# Patient Record
Sex: FEMALE | Race: ASIAN | NOT HISPANIC OR LATINO | Employment: STUDENT | URBAN - METROPOLITAN AREA
[De-identification: names, ages, dates, MRNs, and addresses within clinical notes are randomized per-mention and may not be internally consistent; named-entity substitution may affect disease eponyms.]

---
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
## 2021-01-03 ENCOUNTER — HEALTH MAINTENANCE LETTER (OUTPATIENT)
Age: 21
End: 2021-01-03

## 2021-01-15 ENCOUNTER — HEALTH MAINTENANCE LETTER (OUTPATIENT)
Age: 21
End: 2021-01-15

## 2021-02-05 ENCOUNTER — MYC MEDICAL ADVICE (OUTPATIENT)
Dept: FAMILY MEDICINE | Facility: CLINIC | Age: 21
End: 2021-02-05

## 2021-02-12 ENCOUNTER — E-VISIT (OUTPATIENT)
Dept: FAMILY MEDICINE | Facility: CLINIC | Age: 21
End: 2021-02-12
Payer: COMMERCIAL

## 2021-02-12 DIAGNOSIS — L70.9 ACNE, UNSPECIFIED ACNE TYPE: Primary | ICD-10-CM

## 2021-02-12 PROCEDURE — 99207 PR NON-BILLABLE SERV PER CHARTING: CPT | Performed by: NURSE PRACTITIONER

## 2021-03-15 ENCOUNTER — OFFICE VISIT (OUTPATIENT)
Dept: FAMILY MEDICINE | Facility: CLINIC | Age: 21
End: 2021-03-15
Payer: COMMERCIAL

## 2021-03-15 VITALS
HEIGHT: 64 IN | RESPIRATION RATE: 12 BRPM | SYSTOLIC BLOOD PRESSURE: 102 MMHG | DIASTOLIC BLOOD PRESSURE: 62 MMHG | WEIGHT: 118.3 LBS | HEART RATE: 80 BPM | OXYGEN SATURATION: 97 % | BODY MASS INDEX: 20.2 KG/M2

## 2021-03-15 DIAGNOSIS — L70.0 ACNE VULGARIS: ICD-10-CM

## 2021-03-15 DIAGNOSIS — Z00.00 ROUTINE GENERAL MEDICAL EXAMINATION AT A HEALTH CARE FACILITY: Primary | ICD-10-CM

## 2021-03-15 PROCEDURE — 99395 PREV VISIT EST AGE 18-39: CPT | Performed by: FAMILY MEDICINE

## 2021-03-15 PROCEDURE — 99213 OFFICE O/P EST LOW 20 MIN: CPT | Mod: 25 | Performed by: FAMILY MEDICINE

## 2021-03-15 RX ORDER — NORGESTIMATE AND ETHINYL ESTRADIOL 7DAYSX3 LO
1 KIT ORAL DAILY
Qty: 84 TABLET | Refills: 3 | Status: SHIPPED | OUTPATIENT
Start: 2021-03-15 | End: 2022-06-23

## 2021-03-15 ASSESSMENT — MIFFLIN-ST. JEOR: SCORE: 1288.67

## 2021-03-15 NOTE — PROGRESS NOTES
SUBJECTIVE:   CC: Jessica Styles is an 20 year old woman who presents for preventive health visit.       Patient has been advised of split billing requirements and indicates understanding: Yes  Healthy Habits:    Getting at least 3 servings of Calcium per day:  Yes    Bi-annual eye exam:  NO    Dental care twice a year:  Yes    Sleep apnea or symptoms of sleep apnea:  None    Diet:  Regular (no restrictions)    Frequency of exercise:  4-5 days/week    Duration of exercise:  45-60 minutes    Taking medications regularly:  Not Applicable    Barriers to taking medications:  Not applicable    Medication side effects:  Not applicable    PHQ-2 Total Score:    Additional concerns today:  Yes  walking - tolerated well.        Acne concerns -  Has used OTC meds.  Cleansers, toner.  Painful one under skin recently.  Not much help with salicylic acid or benzoyl peroxide  Not sexually active, not planning sexual activity    Today's PHQ-2 Score:   PHQ-2 ( 1999 Pfizer) 3/15/2021   Q1: Little interest or pleasure in doing things 0   Q2: Feeling down, depressed or hopeless 0   PHQ-2 Score 0       Abuse: Current or Past (Physical, Sexual or Emotional) - No  Do you feel safe in your environment? Yes       Have you ever done Advance Care Planning? (For example, a Health Directive, POLST, or a discussion with a medical provider or your loved ones about your wishes): No, advance care planning information given to patient to review.  Patient declined advance care planning discussion at this time.    Social History     Tobacco Use     Smoking status: Never Smoker     Smokeless tobacco: Never Used   Substance Use Topics     Alcohol use: Never     Frequency: Never     If you drink alcohol do you typically have >3 drinks per day or >7 drinks per week? No    Alcohol Use 3/15/2021   Prescreen: >3 drinks/day or >7 drinks/week? No         Reviewed orders with patient.  Reviewed health maintenance and updated orders accordingly - Yes  BP  "Readings from Last 3 Encounters:   03/15/21 102/62   12/12/19 92/50   01/08/19 126/81 (94 %, Z = 1.53 /  95 %, Z = 1.64)*     *BP percentiles are based on the 2017 AAP Clinical Practice Guideline for girls    Wt Readings from Last 3 Encounters:   03/15/21 53.7 kg (118 lb 4.8 oz) (32 %, Z= -0.48)*   12/12/19 50.6 kg (111 lb 9.6 oz) (23 %, Z= -0.74)*   01/08/19 52.2 kg (115 lb) (35 %, Z= -0.40)*     * Growth percentiles are based on [facility] (Girls, 2-20 Years) data.                        History of abnormal Pap smear: NO - under age 21, PAP not appropriate for age     Reviewed and updated as needed this visit by clinical staff  Tobacco  Allergies  Meds   Med Hx  Surg Hx  Fam Hx  Soc Hx        Reviewed and updated as needed this visit by Provider  Tobacco  Allergies  Meds   Med Hx  Surg Hx  Fam Hx  Soc Hx       History reviewed. No pertinent past medical history.   Past Surgical History:   Procedure Laterality Date     NO HISTORY OF SURGERY         Review of Systems  10 point ROS of systems including Constitutional, Eyes, Respiratory, Cardiovascular, Gastroenterology, Genitourinary, Integumentary, Muscularskeletal, Psychiatric were all negative except for pertinent positives noted in my HPI.       OBJECTIVE:   /62   Pulse 80   Resp 12   Ht 1.613 m (5' 3.5\")   Wt 53.7 kg (118 lb 4.8 oz)   LMP 03/02/2021 (Approximate)   SpO2 97%   BMI 20.63 kg/m    Physical Exam  GENERAL: healthy, alert and no distress  EYES: Eyes grossly normal to inspection, PERRL and conjunctivae and sclerae normal  HENT: ear canals and TM's normal, nose and mouth without ulcers or lesions  NECK: no adenopathy, no asymmetry, masses, or scars and thyroid normal to palpation  RESP: lungs clear to auscultation - no rales, rhonchi or wheezes  BREAST: normal without masses, tenderness or nipple discharge and no palpable axillary masses or adenopathy  CV: regular rate and rhythm, normal S1 S2, no S3 or S4, no murmur, click or rub, no " "peripheral edema and peripheral pulses strong  ABDOMEN: soft, nontender, no hepatosplenomegaly, no masses and bowel sounds normal  MS: no gross musculoskeletal defects noted, no edema  SKIN: no suspicious lesions or rashes, acne across the face and upper back - comedone closed.  No current cystic lesions.  Has photos of cystic lesions that come cyclically.  NEURO: Normal strength and tone, mentation intact and speech normal  PSYCH: mentation appears normal, affect normal/bright        ASSESSMENT/PLAN:   1. Routine general medical examination at a health care facility  Screening and preventative care discussed.  Discussed completing varicella, Hep B vaccine series - declines currently.  Declines lab testing.     2. Acne vulgaris  Discussed various forms of treatment including topicals and oral medications-antibiotics and oral contraceptives.  At this point time she prefers to try the oral contraceptive.  She is instructed to begin this after her next menstrual cycle.  We discussed risks and benefits.  - norgestim-eth estrad triphasic (ORTHO TRI-CYCLEN LO) 0.18/0.215/0.25 MG-25 MCG tablet; Take 1 tablet by mouth daily  Dispense: 84 tablet; Refill: 3    Patient has been advised of split billing requirements and indicates understanding: Yes  COUNSELING:  Reviewed preventive health counseling, as reflected in patient instructions       Regular exercise       Healthy diet/nutrition       Vision screening       Immunizations    Declined: Hepatitis B, Influenza and Varicella due to Other does not want a shot               Family planning       Osteoporosis prevention/bone health       Safe sex practices/STD prevention    Estimated body mass index is 20.63 kg/m  as calculated from the following:    Height as of this encounter: 1.613 m (5' 3.5\").    Weight as of this encounter: 53.7 kg (118 lb 4.8 oz).        She reports that she has never smoked. She has never used smokeless tobacco.      Counseling Resources:  ATP IV " Guidelines  Pooled Cohorts Equation Calculator  Breast Cancer Risk Calculator  BRCA-Related Cancer Risk Assessment: FHS-7 Tool  FRAX Risk Assessment  ICSI Preventive Guidelines  Dietary Guidelines for Americans, 2010  USDA's MyPlate  ASA Prophylaxis  Lung CA Screening    Arleen Menendez MD  [facility]        Patient Instructions   On the Sunday after your next menstrual period starts begin the birth control pills for your skin.  Take approximately the same time daily.    Follow up for side effects or other concerns.    Continue topical treatments.      Follow up in 3 months if skin is not improved with medication.

## 2021-03-15 NOTE — PATIENT INSTRUCTIONS
On the Sunday after your next menstrual period starts begin the birth control pills for your skin.  Take approximately the same time daily.    Follow up for side effects or other concerns.    Continue topical treatments.      Follow up in 3 months if skin is not improved with medication.

## 2021-03-16 PROBLEM — L70.0 ACNE VULGARIS: Status: ACTIVE | Noted: 2021-03-16

## 2021-03-28 ENCOUNTER — MYC MEDICAL ADVICE (OUTPATIENT)
Dept: FAMILY MEDICINE | Facility: CLINIC | Age: 21
End: 2021-03-28

## 2021-07-26 ENCOUNTER — MYC MEDICAL ADVICE (OUTPATIENT)
Dept: FAMILY MEDICINE | Facility: CLINIC | Age: 21
End: 2021-07-26

## 2021-07-26 DIAGNOSIS — B00.1 RECURRENT HERPES LABIALIS: ICD-10-CM

## 2021-07-27 NOTE — TELEPHONE ENCOUNTER
Routing refill request to provider for review/approval because:  Labs not current:  Creatinine    Radha Daylin Richard RN, BSN, CMSRN  [facility]

## 2021-07-28 RX ORDER — VALACYCLOVIR HYDROCHLORIDE 1 G/1
2000 TABLET, FILM COATED ORAL 2 TIMES DAILY
Qty: 4 TABLET | Refills: 4 | Status: SHIPPED | OUTPATIENT
Start: 2021-07-28 | End: 2021-08-02

## 2021-07-31 DIAGNOSIS — B00.1 RECURRENT HERPES LABIALIS: ICD-10-CM

## 2021-08-01 DIAGNOSIS — B00.1 RECURRENT HERPES LABIALIS: ICD-10-CM

## 2021-08-02 DIAGNOSIS — B00.1 RECURRENT HERPES LABIALIS: ICD-10-CM

## 2021-08-02 RX ORDER — VALACYCLOVIR HYDROCHLORIDE 1 G/1
TABLET, FILM COATED ORAL
Qty: 4 TABLET | Refills: 4 | OUTPATIENT
Start: 2021-08-02

## 2021-08-02 RX ORDER — VALACYCLOVIR HYDROCHLORIDE 1 G/1
TABLET, FILM COATED ORAL
Qty: 4 TABLET | Refills: 4 | Status: SHIPPED | OUTPATIENT
Start: 2021-08-02 | End: 2021-11-03

## 2021-08-02 NOTE — TELEPHONE ENCOUNTER
Routing refill request to provider for review/approval because:  Labs not current:  Creatinine    Lorena Holland BSN, RN

## 2021-08-02 NOTE — TELEPHONE ENCOUNTER
Duplicate order prescription filled on 8/2/2021 with 4 refills.   Siri Starkey RN, BSN   [facility]

## 2021-08-03 RX ORDER — VALACYCLOVIR HYDROCHLORIDE 1 G/1
TABLET, FILM COATED ORAL
Qty: 4 TABLET | Refills: 4 | OUTPATIENT
Start: 2021-08-03

## 2021-08-03 NOTE — TELEPHONE ENCOUNTER
Duplicate refill request for valacyclovir.   This prescription was refilled on 8/2/21.         Jennifer Mckeon RN  [facility]

## 2021-10-10 ENCOUNTER — HEALTH MAINTENANCE LETTER (OUTPATIENT)
Age: 21
End: 2021-10-10

## 2021-11-03 DIAGNOSIS — B00.1 RECURRENT HERPES LABIALIS: ICD-10-CM

## 2021-11-04 RX ORDER — VALACYCLOVIR HYDROCHLORIDE 1 G/1
TABLET, FILM COATED ORAL
Qty: 4 TABLET | Refills: 4 | Status: SHIPPED | OUTPATIENT
Start: 2021-11-04 | End: 2022-06-23

## 2021-11-04 NOTE — TELEPHONE ENCOUNTER
"Requested Prescriptions   Pending Prescriptions Disp Refills     valACYclovir (VALTREX) 1000 mg tablet 4 tablet 4       Antivirals for Herpes Protocol Failed - 11/3/2021 12:43 PM        Failed - Normal serum creatinine on file in past 12 months     No lab results found.    Ok to refill medication if creatinine is low          Passed - Patient is age 12 or older        Passed - Recent (12 mo) or future (30 days) visit within the authorizing provider's specialty     Patient has had an office visit with the authorizing provider or a provider within the authorizing providers department within the previous 12 mos or has a future within next 30 days. See \"Patient Info\" tab in inbasket, or \"Choose Columns\" in Meds & Orders section of the refill encounter.              Passed - Medication is active on med list           Lorena PERSAUD, RN    "

## 2022-02-14 ENCOUNTER — NURSE TRIAGE (OUTPATIENT)
Dept: NURSING | Facility: CLINIC | Age: 22
End: 2022-02-14
Payer: COMMERCIAL

## 2022-02-14 NOTE — TELEPHONE ENCOUNTER
Pt stated that a family member tested positive for COVID - pt has been in contact with that family member     PT is vaccinated for COVID     Pt is having sinus congestion and no other symptoms      Per protocol - Call PCP within 24 hours     Pt will be contacting [facility] today     Care advice given per protocol and when to call back. Pt verbalized understanding and agrees to plan of care.    Brooklyn Ramos RN  [place] [facility] Advisor  6:57 AM 2/14/2022      COVID 19 Nurse Triage Plan/Patient Instructions    Please be aware that novel coronavirus (COVID-19) may be circulating in the community. If you develop symptoms such as fever, cough, or SOB or if you have concerns about the presence of another infection including coronavirus (COVID-19), please contact your health care provider or visit [email or web address].     Disposition/Instructions    Virtual Visit with provider recommended. Reference Visit Selection Guide.    Thank you for taking steps to prevent the spread of this virus.  o Limit your contact with others.  o Wear a simple mask to cover your cough.  o Wash your hands well and often.    Resources    [facility]: About COVID-19: [email or web address]    CDC: What to Do If You're Sick: www.cdc.gov/coronavirus/2019-ncov/about/steps-when-sick.html    CDC: Ending Home Isolation: www.cdc.gov/coronavirus/2019-ncov/hcp/disposition-in-home-patients.html     CDC: Caring for Someone: www.cdc.gov/coronavirus/2019-ncov/if-you-are-sick/care-for-someone.html     [facility]: Interim Guidance for Hospital Discharge to Home: www.health.[facility]/diseases/coronavirus/hcp/hospdischarge.pdf    [facility] clinical trials (COVID-19 research studies): clinicalaffairs.[facility].edu/um-clinical-trials     Below are the COVID-19 hotlines at the [place] Department of Health ([facility]). Interpreters are available.   o For health questions: Call 618-102-6908 or 1-381.240.2545 (7 a.m. to 7 p.m.)  o For questions  about schools and childcare: Call 572-207-4185 or 1-639.842.9370 (7 a.m. to 7 p.m.)                                 Reason for Disposition    [1] CLOSE CONTACT COVID-19 EXPOSURE within last 14 days AND [2] needs COVID-19 lab test to return to work AND [3] NO symptoms    Additional Information    Negative: COVID-19 lab test positive    Negative: [1] Lives with someone known to have influenza (flu test positive) AND [2] flu-like symptoms (e.g., cough, runny nose, sore throat, SOB; with or without fever)    Negative: [1] Symptoms of COVID-19 (e.g., cough, fever, SOB, or others) AND [2] HCP diagnosed COVID-19 based on symptoms    Negative: [1] Symptoms of COVID-19 (e.g., cough, fever, SOB, or others) AND [2] lives in an area with community spread    Negative: [1] Symptoms of COVID-19 (e.g., cough, fever, SOB, or others) AND [2] within 14 days of EXPOSURE (close contact) with diagnosed or suspected COVID-19 patient    Negative: [1] Symptoms of COVID-19 (e.g., cough, fever, SOB, or others) AND [2] within 14 days of travel from high-risk area for COVID-19 community spread (identified by CDC)    Negative: [1] Difficulty breathing (shortness of breath) occurs AND [2] onset > 14 days after COVID-19 EXPOSURE (Close Contact)    Negative: [1] Dry cough occurs AND [2] onset > 14 days after COVID-19 EXPOSURE    Negative: [1] Wet cough (i.e., white-yellow, yellow, green, or vinayak colored sputum) AND [2] onset > 14 days after COVID-19 EXPOSURE    Negative: [1] Common cold symptoms AND [2] onset > 14 days after COVID-19 EXPOSURE    Negative: COVID-19 vaccine reaction suspected (e.g., fever, headache, muscle aches) occurring during days 1-3 after getting vaccine    Negative: COVID-19 vaccine, questions about    Protocols used: CORONAVIRUS (COVID-19) EXPOSURE-A- 8.25.2021

## 2022-04-06 ENCOUNTER — TELEPHONE (OUTPATIENT)
Dept: FAMILY MEDICINE | Facility: CLINIC | Age: 22
End: 2022-04-06
Payer: COMMERCIAL

## 2022-05-21 ENCOUNTER — HEALTH MAINTENANCE LETTER (OUTPATIENT)
Age: 22
End: 2022-05-21

## 2022-06-23 ENCOUNTER — OFFICE VISIT (OUTPATIENT)
Dept: FAMILY MEDICINE | Facility: CLINIC | Age: 22
End: 2022-06-23
Payer: COMMERCIAL

## 2022-06-23 VITALS
RESPIRATION RATE: 16 BRPM | HEART RATE: 94 BPM | OXYGEN SATURATION: 99 % | SYSTOLIC BLOOD PRESSURE: 122 MMHG | BODY MASS INDEX: 20.77 KG/M2 | TEMPERATURE: 98.7 F | HEIGHT: 63 IN | WEIGHT: 117.2 LBS | DIASTOLIC BLOOD PRESSURE: 82 MMHG

## 2022-06-23 DIAGNOSIS — Z00.00 ROUTINE GENERAL MEDICAL EXAMINATION AT A HEALTH CARE FACILITY: Primary | ICD-10-CM

## 2022-06-23 PROCEDURE — 99395 PREV VISIT EST AGE 18-39: CPT | Performed by: INTERNAL MEDICINE

## 2022-06-23 ASSESSMENT — ENCOUNTER SYMPTOMS
JOINT SWELLING: 0
ARTHRALGIAS: 0
HEARTBURN: 0
HEADACHES: 0
EYE PAIN: 0
DYSURIA: 0
BREAST MASS: 0
HEMATOCHEZIA: 0
PALPITATIONS: 0
MYALGIAS: 0
SHORTNESS OF BREATH: 0
COUGH: 0
WEAKNESS: 0
HEMATURIA: 0
CHILLS: 0
FREQUENCY: 0
CONSTIPATION: 0
DIARRHEA: 0
NAUSEA: 0
PARESTHESIAS: 0
NERVOUS/ANXIOUS: 0
FEVER: 0
ABDOMINAL PAIN: 0
DIZZINESS: 0
SORE THROAT: 0

## 2022-06-23 ASSESSMENT — PAIN SCALES - GENERAL: PAINLEVEL: NO PAIN (0)

## 2022-06-23 NOTE — PROGRESS NOTES
SUBJECTIVE:   CC: Jessica Styles is an 21 year old woman who presents for preventive health visit.       Patient has been advised of split billing requirements and indicates understanding: Yes  New patient to this provider.  She reports eating healthy, no specific concerns.  She is college student at [employer].  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  She is taking some summer classes to make up for a few missed classes for the new major.    Healthy Habits:     Getting at least 3 servings of Calcium per day:  Yes    Bi-annual eye exam:  Yes    Dental care twice a year:  Yes    Sleep apnea or symptoms of sleep apnea:  None    Diet:  Breakfast skipped    Frequency of exercise:  6-7 days/week    Duration of exercise:  Greater than 60 minutes    Taking medications regularly:  Yes    Medication side effects:  None    PHQ-2 Total Score: 0    Additional concerns today:  No        Today's PHQ-2 Score:   PHQ-2 ( 1999 Pfizer) 6/23/2022   Q1: Little interest or pleasure in doing things 0   Q2: Feeling down, depressed or hopeless 0   PHQ-2 Score 0   PHQ-2 Total Score (12-17 Years)- Positive if 3 or more points; Administer PHQ-A if positive -   Q1: Little interest or pleasure in doing things Not at all   Q2: Feeling down, depressed or hopeless Not at all   PHQ-2 Score 0       Abuse: Current or Past (Physical, Sexual or Emotional) - No  Do you feel safe in your environment? Yes        Social History     Tobacco Use     Smoking status: Never Smoker     Smokeless tobacco: Never Used   Substance Use Topics     Alcohol use: Never     If you drink alcohol do you typically have >3 drinks per day or >7 drinks per week? No    Alcohol Use 6/23/2022   Prescreen: >3 drinks/day or >7 drinks/week? No   Prescreen: >3 drinks/day or >7 drinks/week? -       Reviewed orders with patient.  Reviewed health maintenance and updated orders accordingly - Yes      Breast Cancer Screening:        History of  "abnormal Pap smear: NO - under age 21, PAP not appropriate for age     Reviewed and updated as needed this visit by clinical staff   Tobacco  Allergies  Meds   Med Hx  Surg Hx  Fam Hx  Soc Hx          Reviewed and updated as needed this visit by Provider                     Review of Systems   Constitutional: Negative for chills and fever.   HENT: Negative for congestion, ear pain, hearing loss and sore throat.    Eyes: Negative for pain and visual disturbance.   Respiratory: Negative for cough and shortness of breath.    Cardiovascular: Negative for chest pain, palpitations and peripheral edema.   Gastrointestinal: Negative for abdominal pain, constipation, diarrhea, heartburn, hematochezia and nausea.   Breasts:  Negative for tenderness, breast mass and discharge.   Genitourinary: Negative for dysuria, frequency, genital sores, hematuria, pelvic pain, urgency, vaginal bleeding and vaginal discharge.   Musculoskeletal: Negative for arthralgias, joint swelling and myalgias.   Skin: Negative for rash.   Neurological: Negative for dizziness, weakness, headaches and paresthesias.   Psychiatric/Behavioral: Negative for mood changes. The patient is not nervous/anxious.           OBJECTIVE:   /82   Pulse 94   Temp 98.7  F (37.1  C) (Temporal)   Resp 16   Ht 1.605 m (5' 3.2\")   Wt 53.2 kg (117 lb 3.2 oz)   LMP 05/01/2022 (Approximate)   SpO2 99%   BMI 20.63 kg/m    Physical Exam  GENERAL: healthy, alert and no distress  EYES: Eyes grossly normal to inspection, PERRL and conjunctivae and sclerae normal  HENT: ear canals and TM's normal, nose and mouth without ulcers or lesions  NECK: no adenopathy, no asymmetry, masses, or scars and thyroid normal to palpation  RESP: lungs clear to auscultation - no rales, rhonchi or wheezes  CV: regular rate and rhythm, normal S1 S2, no S3 or S4, no murmur, click or rub, no peripheral edema and peripheral pulses strong  ABDOMEN: soft, nontender, no hepatosplenomegaly, " "no masses and bowel sounds normal  MS: no gross musculoskeletal defects noted, no edema  SKIN: no suspicious lesions or rashes  NEURO: Normal strength and tone, mentation intact and speech normal  PSYCH: mentation appears normal, affect normal/bright    Diagnostic Test Results:  none     ASSESSMENT/PLAN:   Jessica was seen today for physical.    Diagnoses and all orders for this visit:    Routine general medical examination at a health care facility    Other orders  -     REVIEW OF HEALTH MAINTENANCE PROTOCOL ORDERS      Patient declined HIV/hepatitis C screening.    Patient has been advised of split billing requirements and indicates understanding: Yes    COUNSELING:  Reviewed preventive health counseling, as reflected in patient instructions    Estimated body mass index is 20.63 kg/m  as calculated from the following:    Height as of this encounter: 1.605 m (5' 3.2\").    Weight as of this encounter: 53.2 kg (117 lb 3.2 oz).        She reports that she has never smoked. She has never used smokeless tobacco.      Counseling Resources:  ATP IV Guidelines  Pooled Cohorts Equation Calculator  Breast Cancer Risk Calculator  BRCA-Related Cancer Risk Assessment: FHS-7 Tool  FRAX Risk Assessment  ICSI Preventive Guidelines  Dietary Guidelines for Americans, 2010  USDA's MyPlate  ASA Prophylaxis  Lung CA Screening    Arlene Gamble MD PhD  [facility]  "

## 2022-09-18 ENCOUNTER — HEALTH MAINTENANCE LETTER (OUTPATIENT)
Age: 22
End: 2022-09-18

## 2022-10-03 ENCOUNTER — NURSE TRIAGE (OUTPATIENT)
Dept: FAMILY MEDICINE | Facility: CLINIC | Age: 22
End: 2022-10-03

## 2022-10-03 ENCOUNTER — MYC MEDICAL ADVICE (OUTPATIENT)
Dept: FAMILY MEDICINE | Facility: CLINIC | Age: 22
End: 2022-10-03

## 2022-10-03 NOTE — TELEPHONE ENCOUNTER
This appears to be a duplicate message. Please see telephone encounter for further information.    Modesta Slater RN    [facility]

## 2022-10-03 NOTE — TELEPHONE ENCOUNTER
This appears to be a duplicate message, please disregard.    Modesta Slater RN    [facility]

## 2022-11-27 ENCOUNTER — MYC MEDICAL ADVICE (OUTPATIENT)
Dept: FAMILY MEDICINE | Facility: CLINIC | Age: 22
End: 2022-11-27

## 2022-11-27 DIAGNOSIS — B00.1 RECURRENT HERPES LABIALIS: ICD-10-CM

## 2022-11-28 NOTE — TELEPHONE ENCOUNTER
Patient called to clinic regarding MyChart message below.  Is requesting refill on the Valacyclovir ASAP, has current outbreak.

## 2022-11-29 PROBLEM — B00.1 RECURRENT HERPES LABIALIS: Status: ACTIVE | Noted: 2022-11-29

## 2022-11-29 RX ORDER — VALACYCLOVIR HYDROCHLORIDE 1 G/1
2000 TABLET, FILM COATED ORAL 2 TIMES DAILY
Qty: 4 TABLET | Refills: 4 | Status: SHIPPED | OUTPATIENT
Start: 2022-11-29 | End: 2023-08-01

## 2023-04-16 ENCOUNTER — E-VISIT (OUTPATIENT)
Dept: URGENT CARE | Facility: CLINIC | Age: 23
End: 2023-04-16

## 2023-04-16 ENCOUNTER — E-VISIT (OUTPATIENT)
Dept: URGENT CARE | Facility: CLINIC | Age: 23
End: 2023-04-16
Payer: COMMERCIAL

## 2023-04-16 DIAGNOSIS — L30.9 ECZEMA, UNSPECIFIED TYPE: Primary | ICD-10-CM

## 2023-04-16 DIAGNOSIS — R21 RASH AND NONSPECIFIC SKIN ERUPTION: Primary | ICD-10-CM

## 2023-04-16 DIAGNOSIS — B00.1 HERPES LABIALIS: Primary | ICD-10-CM

## 2023-04-16 PROCEDURE — 99421 OL DIG E/M SVC 5-10 MIN: CPT | Performed by: NURSE PRACTITIONER

## 2023-04-16 PROCEDURE — 99421 OL DIG E/M SVC 5-10 MIN: CPT | Performed by: INTERNAL MEDICINE

## 2023-04-16 PROCEDURE — 99207 PR NON-BILLABLE SERV PER CHARTING: CPT | Performed by: NURSE PRACTITIONER

## 2023-04-16 RX ORDER — TRIAMCINOLONE ACETONIDE 1 MG/G
OINTMENT TOPICAL 2 TIMES DAILY
Qty: 80 G | Refills: 1 | Status: SHIPPED | OUTPATIENT
Start: 2023-04-16 | End: 2023-04-18

## 2023-04-16 RX ORDER — VALACYCLOVIR HYDROCHLORIDE 1 G/1
2000 TABLET, FILM COATED ORAL 2 TIMES DAILY
Qty: 4 TABLET | Refills: 1 | Status: SHIPPED | OUTPATIENT
Start: 2023-04-16 | End: 2023-10-23

## 2023-04-16 NOTE — PATIENT INSTRUCTIONS
Dear Allison No MD    After reviewing your responses, I've been able to diagnose you with coldsores which are common mouth sores caused by infection with the virus herpes.    Based on your responses, I have prescribed Valcyclovir to treat this. Please follow the instructions on the medication. If you experience irritation of your skin, new rash, or any other new symptoms, you should stop using this medication and contact your primary care provider.    If this treatment does not work for you or your sores are worsening instead of improving or do not resolve in 7 days, please plan to follow-up with your primary care provider. They may be able to offer refills for you for future outbreaks as well.    Thanks for choosing?us?as your health care partner,?   ?   Allison No MD?

## 2023-04-16 NOTE — PATIENT INSTRUCTIONS
Dear Jessica Styles    After reviewing your responses, I've been able to diagnose you with eczema, which is a common skin condition that causes small fluid-filled blisters or bumps to appear on your skin. The exact cause is unknown but your risk may increase if you have allergies, smoke, or have other skin conditions. Some foods such as mushrooms, chocolate and coffee also are known potential triggers.     Things you can do to help prevent this:     Do not scratch your rash.Bacteria from your fingernails may enter your open sores during scratching and cause an infection.     Use moisturizes or emollients, such as petroleum jelly.These help relieve itching and help prevent bacteria from getting in your sores. If you have a doctor's order for medicated cream, apply that first. Then apply the moisturizer or emollient on top.    Thanks for choosing us as your health care partner,    SWATI Hayward CNP

## 2023-04-18 ENCOUNTER — OFFICE VISIT (OUTPATIENT)
Dept: DERMATOLOGY | Facility: CLINIC | Age: 23
End: 2023-04-18
Payer: COMMERCIAL

## 2023-04-18 DIAGNOSIS — L70.0 ACNE VULGARIS: ICD-10-CM

## 2023-04-18 DIAGNOSIS — L30.9 DERMATITIS: Primary | ICD-10-CM

## 2023-04-18 DIAGNOSIS — L71.0 PERIORAL DERMATITIS: ICD-10-CM

## 2023-04-18 DIAGNOSIS — K13.0 CHEILITIS: ICD-10-CM

## 2023-04-18 PROCEDURE — 99204 OFFICE O/P NEW MOD 45 MIN: CPT | Performed by: DERMATOLOGY

## 2023-04-18 RX ORDER — TACROLIMUS 1 MG/G
OINTMENT TOPICAL 2 TIMES DAILY
Qty: 60 G | Refills: 11 | Status: SHIPPED | OUTPATIENT
Start: 2023-04-18 | End: 2024-07-19

## 2023-04-18 ASSESSMENT — PAIN SCALES - GENERAL: PAINLEVEL: NO PAIN (0)

## 2023-04-18 NOTE — PATIENT INSTRUCTIONS
Recommendations for dry skin and dermatitis   1. Bathe or shower daily in lukewarm water  2. Use a gentle non-soap detergent cleanser  - Soaps are alkaline (which can irritate sensitive skin) and remove natural moisturizing factors   - Recommended products, in no particular order, include:   - Bars:    - Aveeno Moisturizing Bar    - Cetaphil Gentle Cleansing Bar    - Dove Sensitive Skin Unscented Beauty Bar    - Olay Ultra Moisture Bar   - Liquid Cleansers:    - Aquanil Cleanser    - CeraVe Hydrating Cleanser    - Cetaphil Gentle Skin Cleanser  - Avoid scented soaps or bath additives unless your doctor tells you otherwise  - Focus on washing the face, underarms, and underwear areas; other sites usually do not need frequent washing  3. Rinse off thoroughly, then pat dry until skin is slightly damp  4. Apply moisturizer to damp skin within 3-5 minutes of exiting the bath/shower  - Recommended products, in no particular order, include:   - Lotions (thinner/lighter, but may be less effective)    - AmLactin Cerapeutic Restoring Body Lotion    - CeraVe Facial Moisturizing Lotion (AM and/or PM)    - Lubriderm Advanced Therapy Lotion   - Creams (thicker, likely the best balance of effectiveness and feel)    - AmLactin Ultra Hydrating Body Cream    - Aveeno Eczema Therapy Moisturizing Cream    - Aveeno Eczema Therapy Itch Relief Balm    - CeraVe Itch Relief Moisturizing Cream   - Ointments (thickest)    - Vaseline  5. If prescribed a topical steroid medication, this may be applied before or after the moisturizer (whichever order you prefer)  6. Reapply moisturizer one or two additional times throughout the day when dry skin is present; once this improves, reduce to daily or every other day as needed to prevent recurrence  7. If dry skin or dermatitis is present on the hands, keep moisturizer near the sink and apply after washing and drying your hands  8. A humidifier may be helpful during the winter months (when ambient  humidity is very low)     https://www.dermstSourceClear.com/weleda-skin-food-75ml/32749160.html?affil=[place]&utm_content=Shop+My+Shelf&utm_term=Editorial+Content&utm_source=[email or web address]&utm_medium=affiliate&utm_campaign=[facility] HospitalWin&sv1=affiliate&sv_campaign_id=426693&awc=[id number]

## 2023-04-18 NOTE — LETTER
4/18/2023       RE: Jessica Styles  [address]  [place] [address]     Dear Colleague,    Thank you for referring your patient, Jessica Styles, to the [facility]. Please see a copy of my visit note below.    [facility] Dermatology Note  Encounter Date: Apr 18, 2023  Office Visit     Dermatology Problem List:    1. Eczematous dermatitis  2. History of perioral dermatitis  3. Acne vulgaris  ____________________________________________    Assessment & Plan:    # Eczematous dermatitis.   - discussed management and/or test interpretation with external physician/other qualified healthcare professional/appropriate source  - start tacrolimus 0.1% external ointment BID  - avoid soap/shampoo use on the face and replace with gentle cleansers   - referral to derm allergy placed; expedited appointment requested    # Acne vulgaris.   - well controlled at this point     # Recent history of perioral dermatitis   - avoid topical steroid ointment use  - well controlled at this point and does not require antibiotics     # Chronic cheilitis   - use Weleda ointment on lips as needed    Procedures Performed: None  Follow-up: 1 year in-person or earlier if symptoms worsen or for new or changing lesions    Staff and Scribe:     Scribe Disclosure:  I, Brady Silva, am serving as a scribe to document services personally performed by Alejandro Guan MD based on data collection and the provider's statements to me.     Amanda Kohli, MS1    Staff Physician:  I was present with the medical student who participated in the service and in the documentation of the note. I have verified the history and personally performed the physical exam and medical decision making. I agree with the assessment and plan of care as documented in the note.     Alejandro Guan MD  Staff Dermatologist and Dermatopathologist  ,  Department of Dermatology   ____________________________________________    CC: Eczema (Patient reports eczema symptoms starting a few months ago. Patient reports symptoms affecting the face. The patient reports no use of OTC medications for treatment. )    HPI:  Ms. Jessica Styles is a 22 year old female with a history of acne and eczema who presents today as a new patient for eczema flare up. Patient has a recent history of perioral dermatitis in 9/2022 treated with doxycycline and ointment. She has intermittently had flare ups in the past after using new shampoos with redness around redness and scaling around the eyes. Her current flare up started a few days ago but without any known triggers. She wakes up in the middle of the night scratching with red itchy skin. No recent changes in detergents, soaps, shampoos, or skin care routine. She had an e-visit with urgent care 2 days ago and was prescribed metronidazole and triamcinalone ointment for this but patient has not yet used this yet. Also notes some chronic dry lips since childhood that does not improve despite drinking a lot of water and applying Vaseline multiple times a day. Patient is otherwise feeling well, without additional skin concerns.    Labs Reviewed:  N/A    Physical Exam:  Vitals: There were no vitals taken for this visit.  SKIN: Focused examination of head and neck was performed.  - Diffuse erythema on the face without scaly patches or plaques.  - No other lesions of concern on areas examined.     Medications:  Current Outpatient Medications   Medication    tacrolimus (PROTOPIC) 0.1 % external ointment    valACYclovir (VALTREX) 1000 mg tablet    valACYclovir (VALTREX) 1000 mg tablet     No current facility-administered medications for this visit.      Past Medical History:   Patient Active Problem List   Diagnosis    Acne vulgaris    Recurrent herpes labialis     History reviewed. No pertinent past medical history.

## 2023-04-18 NOTE — NURSING NOTE
Dermatology Rooming Note    Jessica Styles's goals for this visit include:   Chief Complaint   Patient presents with     Eczema     Patient reports eczema symptoms starting a few months ago. Patient reports symptoms affecting the face. The patient reports no use of OTC medications for treatment.      Venice Rivera LPN

## 2023-04-18 NOTE — PROGRESS NOTES
[facility] Dermatology Note  Encounter Date: Apr 18, 2023  Office Visit     Dermatology Problem List:    1. Eczematous dermatitis  2. History of perioral dermatitis  3. Acne vulgaris  ____________________________________________    Assessment & Plan:    # Eczematous dermatitis.   - discussed management and/or test interpretation with external physician/other qualified healthcare professional/appropriate source  - start tacrolimus 0.1% external ointment BID  - avoid soap/shampoo use on the face and replace with gentle cleansers   - referral to derm allergy placed; expedited appointment requested    # Acne vulgaris.   - well controlled at this point     # Recent history of perioral dermatitis   - avoid topical steroid ointment use  - well controlled at this point and does not require antibiotics     # Chronic cheilitis   - use Weleda ointment on lips as needed    Procedures Performed: None  Follow-up: 1 year in-person or earlier if symptoms worsen or for new or changing lesions    Staff and Scribe:     Scribe Disclosure:  I, Brady Silva, am serving as a scribe to document services personally performed by Alejandro Guan MD based on data collection and the provider's statements to me.     Amanda Kohli, MS1    Staff Physician:  I was present with the medical student who participated in the service and in the documentation of the note. I have verified the history and personally performed the physical exam and medical decision making. I agree with the assessment and plan of care as documented in the note.     Alejandro Guan MD  Staff Dermatologist and Dermatopathologist  , Department of Dermatology   ____________________________________________    CC: Eczema (Patient reports eczema symptoms starting a few months ago. Patient reports symptoms affecting the face. The patient reports no use of OTC medications for treatment. )    HPI:  Ms. Jessica Styles is a 22 year old female  with a history of acne and eczema who presents today as a new patient for eczema flare up. Patient has a recent history of perioral dermatitis in 9/2022 treated with doxycycline and ointment. She has intermittently had flare ups in the past after using new shampoos with redness around redness and scaling around the eyes. Her current flare up started a few days ago but without any known triggers. She wakes up in the middle of the night scratching with red itchy skin. No recent changes in detergents, soaps, shampoos, or skin care routine. She had an e-visit with urgent care 2 days ago and was prescribed metronidazole and triamcinalone ointment for this but patient has not yet used this yet. Also notes some chronic dry lips since childhood that does not improve despite drinking a lot of water and applying Vaseline multiple times a day. Patient is otherwise feeling well, without additional skin concerns.    Labs Reviewed:  N/A    Physical Exam:  Vitals: There were no vitals taken for this visit.  SKIN: Focused examination of head and neck was performed.  - Diffuse erythema on the face without scaly patches or plaques.  - No other lesions of concern on areas examined.     Medications:  Current Outpatient Medications   Medication     tacrolimus (PROTOPIC) 0.1 % external ointment     valACYclovir (VALTREX) 1000 mg tablet     valACYclovir (VALTREX) 1000 mg tablet     No current facility-administered medications for this visit.      Past Medical History:   Patient Active Problem List   Diagnosis     Acne vulgaris     Recurrent herpes labialis     History reviewed. No pertinent past medical history.

## 2023-04-20 ENCOUNTER — TELEPHONE (OUTPATIENT)
Dept: ALLERGY | Facility: CLINIC | Age: 23
End: 2023-04-20
Payer: COMMERCIAL

## 2023-04-20 NOTE — TELEPHONE ENCOUNTER
Left VM with patient regarding getting her in for a sooner allergy appointment than October per Dr. Juan and Dr. Guan staff message.

## 2023-04-25 NOTE — TELEPHONE ENCOUNTER
FUTURE VISIT INFORMATION      FUTURE VISIT INFORMATION:    Date: 4.28.23    Time: 11:30    Location: [facility]  REFERRAL INFORMATION:    Referring provider:  [facility]    Referring providers clinic:  Derm    Reason for visit/diagnosis  Patch test consult - recs in Epic, Per Pt    RECORDS REQUESTED FROM:       Clinic name Comments Records Status Imaging Status   [facility]    [facility] 4.16.23  [facility]   FP 12.29.21  [facility]

## 2023-04-27 NOTE — PROGRESS NOTES
[facility] Dermato-allergology Note  Office visit  Encounter Date: Apr 28, 2023  ____________________________________________    CC: Allergy Consult (Jessica is here today for patch testing consult)      HPI:  (Apr 28, 2023)  Ms. Jessica Styles is a(n) 22 year old female who presents today as new patient for allergy consultation  - Oct 2022, had perioral dermatitis, treated with doxy and this helped a lot and also elidel   - around Dec 2022, started to get eczema around eyes; went away within a few months with hydrocortisone   - then a few months ago, had eczema all over face. Treated this tacrolimus cream starting a few weeks ago and this helped but still active  - wondering if it could be any of her products causing the recurrent facial eczema    - denies any new products  - current facial products: Sunscreen and moisturizer; makeup occasionally   - current shampoo: Elder brand   - denies rash anywhere else on the body   - otherwise feeling well in usual state of health    Physical exam:  General: In no acute distress, well-developed, well-nourished  Eyes: no conjunctivitis  ENT: no signs of rhinitis   Pulmonary: no wheezing or coughing  Skin: Focused examination of the skin on test sites was performed = see test results below  Focused examination of the face was performed.  - ill-defined scaly faint patches on bilateral upper eyelids and lateral cheeks       Past Medical History:   Patient Active Problem List   Diagnosis     Acne vulgaris     Recurrent herpes labialis     No past medical history on file.    Allergies:  No Known Allergies    Medications:  Current Outpatient Medications   Medication     tacrolimus (PROTOPIC) 0.1 % external ointment     valACYclovir (VALTREX) 1000 mg tablet     valACYclovir (VALTREX) 1000 mg tablet     No current facility-administered medications for this visit.       Social History:  The patient is a student. Denies new hobbies.     Family History:  Family  History   Problem Relation Age of Onset     No Known Problems Mother      No Known Problems Father      Breast Cancer Maternal Grandmother      No Known Problems Maternal Grandfather      No Known Problems Paternal Grandmother      No Known Problems Paternal Grandfather      No Known Problems Brother      No Known Problems Sister      No Known Problems Son      No Known Problems Daughter      No Known Problems Maternal Half-Brother      No Known Problems Maternal Half-Sister      No Known Problems Paternal Half-Brother      No Known Problems Paternal Half-Sister      No Known Problems Niece      No Known Problems Nephew      No Known Problems Cousin      No Known Problems Other      Cancer No family hx of      Diabetes No family hx of      Coronary Artery Disease No family hx of      Heart Disease No family hx of      Hypertension No family hx of      Hyperlipidemia No family hx of      Kidney Disease No family hx of      Cerebrovascular Disease No family hx of      Obesity No family hx of      Thrombosis No family hx of      Asthma No family hx of      Arthritis No family hx of      Thyroid Disease No family hx of      Depression No family hx of      Mental Illness No family hx of      Substance Abuse No family hx of      Cystic Fibrosis No family hx of      Early Death No family hx of      Coronary Artery Disease Early Onset No family hx of      Heart Failure No family hx of      Bleeding Diathesis No family hx of      Dementia No family hx of      Ovarian Cancer No family hx of      Uterine Cancer No family hx of      Prostate Cancer No family hx of      Colorectal Cancer No family hx of      Pancreatic Cancer No family hx of      Lung Cancer No family hx of      Melanoma No family hx of      Autoimmune Disease No family hx of      Unknown/Adopted No family hx of      Genetic Disorder No family hx of        Previous Labs, Allergy Tests, Dermatopathology, Imaging:  none    Referred By: No referring provider  defined for this encounter.     Allergy Tests:    Past Allergy Test    Order for Future Allergy Testing:    [x] Outpatient  [] Inpatient: [facility]..../ Bed ....       Skin Atopy (atopic dermatitis) [x] Yes   [] No .........hx of childhood eczema and more recent facial eczema  Contact allergies:   [] Yes   [] No ..........?? told in past allergy to toothpaste and fruit based on rash morphology but didn't under go testing   Hand eczema:   [] Yes   [x] No           Leading hand:   [] R   [] L       [] Ambidextrous         Drug allergies:        [] Yes   [x] No  which?......     Urticaria/Angioedema  [] Yes   [x] No .........  Food Allergy:  [] Yes   [] No  Which?......?? told in past fruit based on rash morphology but didn't under go testing   Pets :  [] Yes   [x] No  which?......         []  Rhinitis   [] Conjunctivitis   [] Sinusitis   [] Polyposis   [x] Otitis (ear itchiness and cough; usually in the Spring; ?seasonal allergies)  [] Pharyngitis         []  Postnasal drip    []  none  Operations:   [] Tonsils   [] Septum   [] Sinus   [] Polyposis        [] Asthma bronchiale   [] Coughing      [x]  none  Symptoms (mostly Rhinoconjunctivitis and Asthma) aggravated by:  Season   [] I   [] II   [] III   [] IV   []V   []VI   []VII   []VIII   []IX   []X   []XI   []XII     [] perennial   Day time      [] morning   [] noon      [] evening        [] night    [] whole day........  []  none  Location/changes    [] inside        [] outside   [] mountains    [] sea     [] others.............   []  none  Triggers, specific     [] animals     [] plants     [] dust              [] others ...........................    []  none  Triggers, others       [] work          [] psyche    [] sport            [] others .............................  []  none  Irritant                [] phys efforts [] smoke    [] heat/cold     [] odors  []others............... []  none    Order for PATCH TESTS  Reason for tests (suspected allergy):  Facial  dermatitis  Known previous allergies:   Standardized panels  [x] Standard panel (40 tests)  [x] Preservatives & Antimicrobials (31 tests)  [x] Emulsifiers & Additives (25 tests)   [x] Perfumes/Flavours & Plants (25 tests)  [] Hairdresser panel (12 tests)  [] Rubber Chemicals (22 tests)  [] Plastics (26 tests)  [] Colorants/Dyes/Food additives (20 tests)  [] Metals (implants/dental) (24 tests)  [] Local anaesthetics/NSAIDs (13 tests)  [] Antibiotics & Antimycotics (14 tests)   [] Corticosteroids (15 tests)   [] Photopatch test (62 tests)   [] others: ...      [] Patient's own products: ...    DO NOT test if chemical or biological identity is unknown!     always ask from patient the product information and safety sheets (MSDS)       Order for PRICK TESTS    Reason for tests (suspected allergy): atopic predisposition (childhood eczema, seasonal ear itchiness in Spring)  Known previous allergies: none    Standardized prick panels  [x] Atopic panel (20 tests)  [] Pediatric Panel (12 tests)  [] Milk, Meat, Eggs, Grains (20 tests)   [] Dust, Epithelia, Feathers (10 tests)  [] Fish, Seafood, Shellfish (17 tests)  [] Nuts, Beans (14 tests)  [] Spice, Vegetable, Fruit (17 tests)  [] Pollen Panel = Tree, Grass, Weed (24 tests)  [] Others: ...      [] Patient's own products: ...    DO NOT test if chemical or biological identity is unknown!     always ask from patient the product information and safety sheets (MSDS)     Standardized intradermal tests  [x] Penicillium notatum [x] Aspergillus fumigatus [x] House dust mites D.far & D. pteron  [] Cat    [] dog  [x] Others: Alternaria  [] Bee venom   [] Wasp venom  !!Specific protocol with dilutions!!     Atopy Screen (Placed Apr 28, 2023)  No Substance Readings (15 min) Evaluation   POS Histamine 1mg/ml ++    NEG NaCl 0.9% -      No Substance Readings (15 min) Evaluation   1 Alternaria alternata (tenuis)  -    2 Cladosporium herbarum -    3 Aspergillus fumigatus -    4 Penicillium  notatum -    5 Dermatophagoides pteronyssinus -    6 Dermatophagoides farinae -    7 Dog epithelium (canis spp) -    8 Cat hair (nelly catus) -    9 Cockroach   (Blatella americana & germanica) -    10 Grass mix midwest   ([place], Orchard, Redtop, Rodrigo) -    11 Esteban grass (sorghum halepense) -    12 Weed mix   (common Cocklebur, Lamb s quarters, rough redroot Pigweed, Dock/Sorrel) -    13 Mug wort (artemisia vulgare) -    14 Ragweed giant/short (ambrosia spp) -    15 White birch (Betula papyrifera) -    16 Tree mix 1 (Pecan, Maple BHR, Oak RVW, american [place], black Arcanum) -    17 Red cedar (juniperus virginia) (+)    18 Tree mix 2   (white Fili, river/red Birch, black [place], common [place], american Elm) -    19 Box elder/Maple mix (acer spp) -    20 [place] shagbark (carya ovata) -           Conclusion      Intradermal Testing (Placed Apr 28, 2023)  No Substance Conc.  Reading (15min)  immediate Papule [mm] / Erythema [mm] Reading   (... days)  delayed Papule [mm] / Erythema [mm] Remarks   DF Standard Dust Mite - D. Farinae 1:10 -   -    DP Standard Dust Mite - D. Pteronyssinus 1:10 -   -    A Aspergillus fumigatus  1:10 +/++ 7/12  -    P Penicillium notatum 1:10 -   -     Alternaria alt 1:10 -   -      1:10 -   -    Conclusions: immediate type reaction to mold Aspergillus, not to dust mites. Patient will feed back if any delayed reactions      ________________________________    Assessment & Plan:    ==> Final Diagnosis:     # Recurrent eczematous facial eruption  * chronic complicated illness  DDx perioral dermatitis/rosacea vs atopic dermatitis vs allergic contact dermatitis   - can continue tacrolimus ointment BID PRN from Dr. Guan   - discussed option of patch testing today. Patient not sure if she wants to pursue patch testing at this time.     # Atopic predisposition with    childhood eczema    seasonal ear itchiness in Spring = mold allergy (Aspergillus)  * chronic stable illness      These  conclusions are made at the best of one's knowledge and belief based on the provided evidence such as patient's history and allergy test results and they can change over time or can be incomplete because of missing information's.    ==> Treatment Plan:  - tacrolimus ointment BID PRN for facial eruption (prescribed by Dr. Guan)     Procedures Performed: Allergy tests, including prick, intradermal tests    Staff and Resident:  Provider  Nancy Brice MD  Dermatology Resident, PGY4    Follow-up in Derm-Allergy clinic for patch tests in July. If any delayed reaction to the IDT we will see patient earlier and then discuss if patch tests necessary    I spent a total of 35 minutes with Jessica Styles. This time was spent counseling the patient and/or coordinating care, explaining the allergy tests, performing allergy tests and assessing the clinical relevance.

## 2023-04-28 ENCOUNTER — OFFICE VISIT (OUTPATIENT)
Dept: ALLERGY | Facility: CLINIC | Age: 23
End: 2023-04-28
Payer: COMMERCIAL

## 2023-04-28 ENCOUNTER — PRE VISIT (OUTPATIENT)
Dept: ALLERGY | Facility: CLINIC | Age: 23
End: 2023-04-28

## 2023-04-28 DIAGNOSIS — L23.89 ALLERGIC CONTACT DERMATITIS DUE TO OTHER AGENTS: ICD-10-CM

## 2023-04-28 DIAGNOSIS — L20.89 OTHER ATOPIC DERMATITIS: ICD-10-CM

## 2023-04-28 DIAGNOSIS — H60.8X9: ICD-10-CM

## 2023-04-28 DIAGNOSIS — Z91.048 ALLERGY TO MOLD: ICD-10-CM

## 2023-04-28 DIAGNOSIS — L30.9 FACIAL DERMATITIS: Primary | ICD-10-CM

## 2023-04-28 PROCEDURE — 95004 PERQ TESTS W/ALRGNC XTRCS: CPT | Mod: GC | Performed by: DERMATOLOGY

## 2023-04-28 PROCEDURE — 95024 IQ TESTS W/ALLERGENIC XTRCS: CPT | Mod: GC | Performed by: DERMATOLOGY

## 2023-04-28 PROCEDURE — 99214 OFFICE O/P EST MOD 30 MIN: CPT | Mod: 25 | Performed by: DERMATOLOGY

## 2023-04-28 NOTE — LETTER
4/28/2023         RE: Jessica Styles  [address]  [facility] [address]        Dear Colleague,    Thank you for referring your patient, Jessica Styles, to the [facility] [place]. Please see a copy of my visit note below.    [facility] Dermato-allergology Note  Office visit  Encounter Date: Apr 28, 2023  ____________________________________________    CC: Allergy Consult (Jessica is here today for patch testing consult)      HPI:  (Apr 28, 2023)  Ms. Jessica Styles is a(n) 22 year old female who presents today as new patient for allergy consultation  - Oct 2022, had perioral dermatitis, treated with doxy and this helped a lot and also elidel   - around Dec 2022, started to get eczema around eyes; went away within a few months with hydrocortisone   - then a few months ago, had eczema all over face. Treated this tacrolimus cream starting a few weeks ago and this helped but still active  - wondering if it could be any of her products causing the recurrent facial eczema    - denies any new products  - current facial products: Sunscreen and moisturizer; makeup occasionally   - current shampoo: Elder brand   - denies rash anywhere else on the body   - otherwise feeling well in usual state of health    Physical exam:  General: In no acute distress, well-developed, well-nourished  Eyes: no conjunctivitis  ENT: no signs of rhinitis   Pulmonary: no wheezing or coughing  Skin: Focused examination of the skin on test sites was performed = see test results below  Focused examination of the face was performed.  - ill-defined scaly faint patches on bilateral upper eyelids and lateral cheeks       Past Medical History:   Patient Active Problem List   Diagnosis     Acne vulgaris     Recurrent herpes labialis     No past medical history on file.    Allergies:  No Known Allergies    Medications:  Current Outpatient Medications   Medication     tacrolimus (PROTOPIC) 0.1 % external ointment      valACYclovir (VALTREX) 1000 mg tablet     valACYclovir (VALTREX) 1000 mg tablet     No current facility-administered medications for this visit.       Social History:  The patient is a student. Denies new hobbies.     Family History:  Family History   Problem Relation Age of Onset     No Known Problems Mother      No Known Problems Father      Breast Cancer Maternal Grandmother      No Known Problems Maternal Grandfather      No Known Problems Paternal Grandmother      No Known Problems Paternal Grandfather      No Known Problems Brother      No Known Problems Sister      No Known Problems Son      No Known Problems Daughter      No Known Problems Maternal Half-Brother      No Known Problems Maternal Half-Sister      No Known Problems Paternal Half-Brother      No Known Problems Paternal Half-Sister      No Known Problems Niece      No Known Problems Nephew      No Known Problems Cousin      No Known Problems Other      Cancer No family hx of      Diabetes No family hx of      Coronary Artery Disease No family hx of      Heart Disease No family hx of      Hypertension No family hx of      Hyperlipidemia No family hx of      Kidney Disease No family hx of      Cerebrovascular Disease No family hx of      Obesity No family hx of      Thrombosis No family hx of      Asthma No family hx of      Arthritis No family hx of      Thyroid Disease No family hx of      Depression No family hx of      Mental Illness No family hx of      Substance Abuse No family hx of      Cystic Fibrosis No family hx of      Early Death No family hx of      Coronary Artery Disease Early Onset No family hx of      Heart Failure No family hx of      Bleeding Diathesis No family hx of      Dementia No family hx of      Ovarian Cancer No family hx of      Uterine Cancer No family hx of      Prostate Cancer No family hx of      Colorectal Cancer No family hx of      Pancreatic Cancer No family hx of      Lung Cancer No family hx of      Melanoma No  family hx of      Autoimmune Disease No family hx of      Unknown/Adopted No family hx of      Genetic Disorder No family hx of        Previous Labs, Allergy Tests, Dermatopathology, Imaging:  none    Referred By: No referring provider defined for this encounter.     Allergy Tests:    Past Allergy Test    Order for Future Allergy Testing:    [x] Outpatient  [] Inpatient: [facility]..../ Bed ....       Skin Atopy (atopic dermatitis) [x] Yes   [] No .........hx of childhood eczema and more recent facial eczema  Contact allergies:   [] Yes   [] No ..........?? told in past allergy to toothpaste and fruit based on rash morphology but didn't under go testing   Hand eczema:   [] Yes   [x] No           Leading hand:   [] R   [] L       [] Ambidextrous         Drug allergies:        [] Yes   [x] No  which?......     Urticaria/Angioedema  [] Yes   [x] No .........  Food Allergy:  [] Yes   [] No  Which?......?? told in past fruit based on rash morphology but didn't under go testing   Pets :  [] Yes   [x] No  which?......         []  Rhinitis   [] Conjunctivitis   [] Sinusitis   [] Polyposis   [x] Otitis (ear itchiness and cough; usually in the Spring; ?seasonal allergies)  [] Pharyngitis         []  Postnasal drip    []  none  Operations:   [] Tonsils   [] Septum   [] Sinus   [] Polyposis        [] Asthma bronchiale   [] Coughing      [x]  none  Symptoms (mostly Rhinoconjunctivitis and Asthma) aggravated by:  Season   [] I   [] II   [] III   [] IV   []V   []VI   []VII   []VIII   []IX   []X   []XI   []XII     [] perennial   Day time      [] morning   [] noon      [] evening        [] night    [] whole day........  []  none  Location/changes    [] inside        [] outside   [] mountains    [] sea     [] others.............   []  none  Triggers, specific     [] animals     [] plants     [] dust              [] others ...........................    []  none  Triggers, others       [] work          [] psyche    [] sport            []  others .............................  []  none  Irritant                [] phys efforts [] smoke    [] heat/cold     [] odors  []others............... []  none    Order for PATCH TESTS  Reason for tests (suspected allergy):  Facial dermatitis  Known previous allergies:   Standardized panels  [x] Standard panel (40 tests)  [x] Preservatives & Antimicrobials (31 tests)  [x] Emulsifiers & Additives (25 tests)   [x] Perfumes/Flavours & Plants (25 tests)  [] Hairdresser panel (12 tests)  [] Rubber Chemicals (22 tests)  [] Plastics (26 tests)  [] Colorants/Dyes/Food additives (20 tests)  [] Metals (implants/dental) (24 tests)  [] Local anaesthetics/NSAIDs (13 tests)  [] Antibiotics & Antimycotics (14 tests)   [] Corticosteroids (15 tests)   [] Photopatch test (62 tests)   [] others: ...      [] Patient's own products: ...    DO NOT test if chemical or biological identity is unknown!     always ask from patient the product information and safety sheets (MSDS)       Order for PRICK TESTS    Reason for tests (suspected allergy): atopic predisposition (childhood eczema, seasonal ear itchiness in Spring)  Known previous allergies: none    Standardized prick panels  [x] Atopic panel (20 tests)  [] Pediatric Panel (12 tests)  [] Milk, Meat, Eggs, Grains (20 tests)   [] Dust, Epithelia, Feathers (10 tests)  [] Fish, Seafood, Shellfish (17 tests)  [] Nuts, Beans (14 tests)  [] Spice, Vegetable, Fruit (17 tests)  [] Pollen Panel = Tree, Grass, Weed (24 tests)  [] Others: ...      [] Patient's own products: ...    DO NOT test if chemical or biological identity is unknown!     always ask from patient the product information and safety sheets (MSDS)     Standardized intradermal tests  [x] Penicillium notatum [x] Aspergillus fumigatus [x] House dust mites D.far & D. pteron  [] Cat    [] dog  [x] Others: Alternaria  [] Bee venom   [] Wasp venom  !!Specific protocol with dilutions!!     Atopy Screen (Placed Apr 28, 2023)  No Substance  Readings (15 min) Evaluation   POS Histamine 1mg/ml ++    NEG NaCl 0.9% -      No Substance Readings (15 min) Evaluation   1 Alternaria alternata (tenuis)  -    2 Cladosporium herbarum -    3 Aspergillus fumigatus -    4 Penicillium notatum -    5 Dermatophagoides pteronyssinus -    6 Dermatophagoides farinae -    7 Dog epithelium (canis spp) -    8 Cat hair (nelly catus) -    9 Cockroach   (Blatella americana & germanica) -    10 Grass mix midwest   (Ansley, Orchard, Redtop, Rodrigo) -    11 Esteban grass (sorghum halepense) -    12 Weed mix   (common Cocklebur, Lamb s quarters, rough redroot Pigweed, Dock/Sorrel) -    13 Mug wort (artemisia vulgare) -    14 Ragweed giant/short (ambrosia spp) -    15 White birch (Betula papyrifera) -    16 Tree mix 1 (Pecan, Maple BHR, Oak RVW, american Dexter, black Duquesne) -    17 Red cedar (juniperus virginia) (+)    18 Tree mix 2   (white Fili, river/red Birch, black [place], common Creston, american Elm) -    19 Box elder/Maple mix (acer spp) -    20 Chandler shagbark (carya ovata) -           Conclusion      Intradermal Testing (Placed Apr 28, 2023)  No Substance Conc.  Reading (15min)  immediate Papule [mm] / Erythema [mm] Reading   (... days)  delayed Papule [mm] / Erythema [mm] Remarks   DF Standard Dust Mite - D. Farinae 1:10 -   -    DP Standard Dust Mite - D. Pteronyssinus 1:10 -   -    A Aspergillus fumigatus  1:10 +/++ 7/12  -    P Penicillium notatum 1:10 -   -     Alternaria alt 1:10 -   -      1:10 -   -    Conclusions: immediate type reaction to mold Aspergillus, not to dust mites. Patient will feed back if any delayed reactions      ________________________________    Assessment & Plan:    ==> Final Diagnosis:     # Recurrent eczematous facial eruption  * chronic complicated illness  DDx perioral dermatitis/rosacea vs atopic dermatitis vs allergic contact dermatitis   - can continue tacrolimus ointment BID PRN from Dr. Guan   - discussed option of patch testing  today. Patient not sure if she wants to pursue patch testing at this time.     # Atopic predisposition with    childhood eczema    seasonal ear itchiness in Spring = mold allergy (Aspergillus)  * chronic stable illness      These conclusions are made at the best of one's knowledge and belief based on the provided evidence such as patient's history and allergy test results and they can change over time or can be incomplete because of missing information's.    ==> Treatment Plan:  - tacrolimus ointment BID PRN for facial eruption (prescribed by Dr. Guan)     Procedures Performed: Allergy tests, including prick, intradermal and patch tests, drug allergy or provocation tests***    Staff and Resident:  Provider  Nancy Brice MD  Dermatology Resident, PGY4    Follow-up in Derm-Allergy clinic for patch tests in July. If any delayed reaction to the IDT we will see patient earlier and then discuss if patch tests necessary    I spent a total of 35 minutes with Jessica Styles. This time was spent counseling the patient and/or coordinating care, explaining the allergy tests *** or procedures, performing allergy tests and assessing the clinical relevance.        Again, thank you for allowing me to participate in the care of your patient.        Sincerely,        Dwight Juan MD

## 2023-04-28 NOTE — PATIENT INSTRUCTIONS
Mold Allergy    Molds are thread-like micro-fungi - they occur all over the world and grow particularly in places where there is moisture. If living spaces are infested with mold, it must be removed quickly. It may pose a health risk.    Triggers of mold allergy   The most important known allergens among fungi are Aspergillus species, Alternaria alternata, Cladosporium species and Penicillium species, which all belong to the category of molds. Molds are found all over the world, but predominantly in nature, mainly in the soil, in dead organic matter. Indoors molds develop in places where it is damp, such as areas affected by leaks, in yasmin cracks or when the relative air humidity is high. Poorly maintained ventilation systems, air humidifiers, aquariums, or decorative fountains and a lot of plants in a room can also lead to a mold infestation.    Symptoms   Mold allergy, like other respiratory allergies, is manifest as allergic runny nose, streaming eyes, cough and shortness of breath and is frequently accompanied by asthma. As well as allergic reactions, molds also cause irritation of the airways, the mucous membranes of the eyes and the skin. The growth of mold is often associated with a distinctively musty smell of earth, damp and mushrooms, which additionally impairs well-being.    Therapy and treatment   Any fungal infestation in living areas must be removed rapidly and properly. It is always important to get rid of the cause, hence eradicate the damp problem. Otherwise the mold will quickly reappear. People with health problems should not remove even small patches of mold growth. Before the mold is cleared and in the weeks following its clearance, rooms should also be aired frequently and dust should be removed.    Tips and tricks:   Keep relative air humidity to a maximum of 45 per cent in winter  Keep temperature between 66 and 73 C in winter  Air rooms thoroughly for five to ten minutes twice to three  "times a day  Position furniture at least ten centimetres away from walls  No plants in the bedroom area  Dispose of compost or organic waste on a daily basis or store temporarily outside the home  Do not use air humidifiers or only use them if air humidity is continuously monitored        Modified from \"[facility]\" by [facility]! [facility] [place].   "

## 2023-05-24 ENCOUNTER — PATIENT OUTREACH (OUTPATIENT)
Dept: CARE COORDINATION | Facility: CLINIC | Age: 23
End: 2023-05-24
Payer: COMMERCIAL

## 2023-06-08 ENCOUNTER — PATIENT OUTREACH (OUTPATIENT)
Dept: CARE COORDINATION | Facility: CLINIC | Age: 23
End: 2023-06-08
Payer: COMMERCIAL

## 2023-06-22 ENCOUNTER — TELEPHONE (OUTPATIENT)
Dept: ALLERGY | Facility: CLINIC | Age: 23
End: 2023-06-22
Payer: COMMERCIAL

## 2023-06-22 NOTE — TELEPHONE ENCOUNTER
Standardized panels  [x]? Standard panel (40 tests)  [x]? Preservatives & Antimicrobials (31 tests)  [x]? Emulsifiers & Additives (25 tests)   [x]? Perfumes/Flavours & Plants (25 tests)  []? Hairdresser panel (12 tests)  []? Rubber Chemicals (22 tests)  []? Plastics (26 tests)  []? Colorants/Dyes/Food additives (20 tests)  []? Metals (implants/dental) (24 tests)  []? Local anaesthetics/NSAIDs (13 tests)  []? Antibiotics & Antimycotics (14 tests)   []? Corticosteroids (15 tests)   []? Photopatch test (62 tests)   []? others: ...    STANDARD Series                                          # Substance 2 days 4 days remarks     1 Mario Mix [C] - -       2 Colophony - -       3  2-Mercaptobenzothiazole  - -       4 Methylisothiazolinone - -       5 Carba Mix - -       6 Thiuram Mix [A] - -       7 Bisphenol A Epoxy Resin - -       8 Z-Kcwx-Mzgktnzmtsh-Formaldehyde Resin - -       9 Mercapto Mix [A] - -       10 Black Rubber Mix- PPD [B] - -       11 Potassium Dichromate  -  -       12 Balsam of [place] (Myroxylon Pereirae Resin) - -       13 Nickel Sulphate Hexahydrate - -       14 Mixed Dialkyl Thiourea - -       15 Paraben Mix [B] - -       16 Methyldibromo Glutaronitrile - -       17 Fragrance Mix 8% - -       18 2-Bromo-2-Nitropropane-1,3-Diol (Bronopol) CT - -       19 Lyral - -       20 Tixocortol-21- Pivalate CT - -       21 Diazolidinyl urea (Germall II) - -        22 Methyl Methacrylate - -       23 Cobalt (II) Chloride Hexahydrate - -       24 Fragrance Mix II  - -       25 Compositae Mix - -       26 Benzoyl Peroxide - -       27 Bacitracin - -       28 Formaldehyde - -       29 Methylchloroisothiazolinone / Methylisothiazolinone - -       30 Corticosteroid Mix CT - -       31 Sodium Lauryl Sulfate - -       32 Lanolin Alcohol - -       33 Turpentine - -       34 Cetylstearylalcohol - -       35 Chlorhexidine Dicluconate - -       36 Budesonide - -       37 Imidazolidinyl Urea  - -       38 Ethyl-2  Cyanoacrylate - -       39 Quaternium 15 (Dowicil 200) - -       40 Decyl Glucoside - -       PRESERVATIVES & ANTIMICROBIALS        # Substance 2 days 4 days remarks   41 1  1,2-Benzisothiazoline-3-One, Sodium Salt - -     2  1,3,5-Roel (2-Hydroxyethyl) - Hexahydrotriazine (Grotan BK) - -     3 7-Udzjojixvfagi-3-Nitro-1, 3-Propanediol - -     4  3, 4, 4' - Triclocarban - -    45 5 4 - Chloro - 3 - Cresol - -     6 4 - Chloro - 4 - Xylenol (PCMX) - -     7 7-Ethylbicyclooxazolidine (Bioban [id number]) - -     8 Benzalkonium Chloride CT - -     9 Benzyl Alcohol - -    50 10 Cetalkonium Chloride - -     11 Cetylpyrimidine Chloride  - -     12 Chloroacetamide - -     13 DMDM Hydantoin - -     14 Glutaraldehyde - -    55 15 Triclosan - -     16 Glyoxal Trimeric Dihydrate - -     17 Iodopropynyl Butylcarbamate - -     18 Octylisothiazoline - -     19 Bithionol CT - -    60 20 Bioban P 1487 (Nitrobutyl) Morpholine/(Ethylnitro-Trimethylene) Dimorpholine - -     21 Phenoxyethanol - -     22 Phenyl Salicylate - -     23 Povidone Iodine - -     24 Sodium Benzoate - -    65 25 Sodium Disulfite - -     26 Sorbic Acid - -     27 Thimerosal - -     28 Melamine Formaldehyde Resin - -     29 Ethylenediamine Dihydrochloride - -      Parabens      70 30 Butyl-P-Hydroxybenzoate - -     31 Ethyl-P-Hydroxybenzoate - -     32 Methyl-P-Hydroxybenzoate - -    73 33 Propyl-P-Hydroxybenzoate - -      EMULSIFIERS & ADDITIVES       # Substance 2 days 4 days remarks   74 1 Polyethylene Glycol-400 - -    75 2 Cocamidopropyl Betaine - -     3 Amerchol L101 - -     4 Propylene Glycol - -     5 Triethanolamine - -     6 Sorbitane Sesquiolate CT - -    80 7 Isopropylmyristate - -     8 Polysorbate 80 CT - -     9 Amidoamine   (Stearamidopropyl Dimethylamine) - -     10 Oleamidopropyl Dimethylamine - -     11 Lauryl Glucoside - -    85 12 Coconut Diethanolamide  - -     13 2-Hydroxy-4-Methoxy Benzophenone (Oxybenzone) - -     14 Benzophenone-4  (Sulisobenzon) - -     15 Propolis - -     16 Dexpanthenol - -    90 17 Abitol - -     18 Tert-Butylhydroquinone - -     19 Benzyl Salicylate - -     20 Dimethylaminopropylamin (DMPA) - -     21 Zinc Pyrithione (Zinc Omadine)  - -    95 22 Roel(Hydroxymethyl) Nitromethane  - -      Antioxidant       23 Dodecyl Gallate - -     24 Butylhydroxyanisole (BHA) - -     25 Butylhydroxytoluene (BHT) - -     26 Di-Alpha-Tocopherol (Vit E) - -    100 27 Propyl Gallate - -      PERFUMES, FLAVORS & PLANTS        # Substance 2 days 4 days remarks   101 1 Benzyl Cinnamate - -     2 Di-Limonene (Dipentene) - -     3 Cananga Odorata (Kan Omer) (I) - -     4 Lichen Acid Mix - -    105 5 Mentha Piperita Oil (Peppermint Oil) - -     6 Sesquiterpenelactone mix - -     7 Tea Tree Oil, Oxidized - -     8 Wood Tar Mix - -     9 Abietic Acid - -    110 10 Lavendula Angustifolia Oil (Lavender Oil) - -     11 Fragrance mix II CT * 14% - -      Fragrance Mix I       12 Oakmoss Absolute - -     13 Eugenol - -     14 Geraniol - -    115 15 Hydroxycitronellal - -     16 Isoeugenol - -     17 Cinnamic Aldehyde - -     18 Cinnamic Alcohol  - -      Fragrance mix II       19 Citronellol - -    120 20 Alpha-Hexylcinnamic Aldehyde    - -     21 Citral - -     22 Farnesol - -    123 23 Coumarin - -      Hexylcinnamic aldehyde, Coumarin, Farnesol, Hydroxyisohexy3-cyclohexene carboxaldehyde, citral, citrolellol  Results of patch tests:                         Interpretation:  - Negative                    A    = Allergic      (+) Erythema    TI   = Toxic/irritant   + E + Infiltration    RaP = Relevance at Present     ++ E/I + Papulovesicle   Rpr  = Relevance Previously     +++ E/I/P + Blister     nR   = No Relevance

## 2023-06-26 ENCOUNTER — OFFICE VISIT (OUTPATIENT)
Dept: ALLERGY | Facility: CLINIC | Age: 23
End: 2023-06-26
Payer: COMMERCIAL

## 2023-06-26 DIAGNOSIS — L20.89 OTHER ATOPIC DERMATITIS: ICD-10-CM

## 2023-06-26 DIAGNOSIS — L23.89 ALLERGIC CONTACT DERMATITIS DUE TO OTHER AGENTS: ICD-10-CM

## 2023-06-26 DIAGNOSIS — H60.8X9: ICD-10-CM

## 2023-06-26 DIAGNOSIS — Z91.048 ALLERGY TO MOLD: ICD-10-CM

## 2023-06-26 DIAGNOSIS — L30.9 FACIAL DERMATITIS: Primary | ICD-10-CM

## 2023-06-26 PROCEDURE — 95044 PATCH/APPLICATION TESTS: CPT | Performed by: DERMATOLOGY

## 2023-06-26 NOTE — LETTER
6/26/2023         RE: Jessica Styles  [address]  [place] [address]        Dear Colleague,    Thank you for referring your patient, Jessica Styles, to the [facility] [place]. Please see a copy of my visit note below.    [facility] Dermato-[facility]ergology Note  Office visit  Encounter Date: Jun 26, 2023  ____________________________________________    CC: No chief complaint on file.      HPI:  (Jun 26, 2023)  Ms. Jessica Styles is a(n) 22 year old female who presents today as a return patient for allergy tests as planned  - Follow-up in Derm-Allergy clinic for patch tests in July. If any delayed reaction to the IDT we will see patient earlier and then discuss if patch tests necessary  - otherwise feeling well in usual state of health    Physical exam:  General: In no acute distress, well-developed, well-nourished  Eyes: no conjunctivitis  ENT: no signs of rhinitis   Pulmonary: no wheezing or coughing  Skin: Focused examination of the skin on test sites was performed = see test results below  No active eczematous skin lesions on tests sites, particularly back    Earlier History and Allergy exams:  (Apr 28, 2023)  - Oct 2022, had perioral dermatitis, treated with doxy and this helped a lot and also elidel   - around Dec 2022, started to get eczema around eyes; went away within a few months with hydrocortisone   - then a few months ago, had eczema all over face. Treated this tacrolimus cream starting a few weeks ago and this helped but still active  - wondering if it could be any of her products causing the recurrent facial eczema    - denies any new products  - current facial products: Sunscreen and moisturizer; makeup occasionally   - current shampoo: Elder brand   - denies rash anywhere else on the body     - ill-defined scaly faint patches on bilateral upper eyelids and lateral cheeks       Past Medical History:   Patient Active Problem List   Diagnosis     Acne vulgaris      Recurrent herpes labialis     No past medical history on file.    Allergies:  No Known Allergies    Medications:  Current Outpatient Medications   Medication     tacrolimus (PROTOPIC) 0.1 % external ointment     valACYclovir (VALTREX) 1000 mg tablet     valACYclovir (VALTREX) 1000 mg tablet     valACYclovir (VALTREX) 1000 mg tablet     No current facility-administered medications for this visit.       Social History:  The patient is a student. Denies new hobbies.     Family History:  Family History   Problem Relation Age of Onset     No Known Problems Mother      No Known Problems Father      Breast Cancer Maternal Grandmother      No Known Problems Maternal Grandfather      No Known Problems Paternal Grandmother      No Known Problems Paternal Grandfather      No Known Problems Brother      No Known Problems Sister      No Known Problems Son      No Known Problems Daughter      No Known Problems Maternal Half-Brother      No Known Problems Maternal Half-Sister      No Known Problems Paternal Half-Brother      No Known Problems Paternal Half-Sister      No Known Problems Niece      No Known Problems Nephew      No Known Problems Cousin      No Known Problems Other      Cancer No family hx of      Diabetes No family hx of      Coronary Artery Disease No family hx of      Heart Disease No family hx of      Hypertension No family hx of      Hyperlipidemia No family hx of      Kidney Disease No family hx of      Cerebrovascular Disease No family hx of      Obesity No family hx of      Thrombosis No family hx of      Asthma No family hx of      Arthritis No family hx of      Thyroid Disease No family hx of      Depression No family hx of      Mental Illness No family hx of      Substance Abuse No family hx of      Cystic Fibrosis No family hx of      Early Death No family hx of      Coronary Artery Disease Early Onset No family hx of      Heart Failure No family hx of      Bleeding Diathesis No family hx of       Dementia No family hx of      Ovarian Cancer No family hx of      Uterine Cancer No family hx of      Prostate Cancer No family hx of      Colorectal Cancer No family hx of      Pancreatic Cancer No family hx of      Lung Cancer No family hx of      Melanoma No family hx of      Autoimmune Disease No family hx of      Unknown/Adopted No family hx of      Genetic Disorder No family hx of        Previous Labs, Allergy Tests, Dermatopathology, Imaging:  none    Referred By: No referring provider defined for this encounter.     Allergy Tests:    Past Allergy Test    Order for Future Allergy Testing:    [x] Outpatient  [] Inpatient: [facility]..../ Bed ....       Skin Atopy (atopic dermatitis) [x] Yes   [] No .........hx of childhood eczema and more recent facial eczema  Contact allergies:   [] Yes   [] No ..........?? told in past allergy to toothpaste and fruit based on rash morphology but didn't under go testing   Hand eczema:   [] Yes   [x] No           Leading hand:   [] R   [] L       [] Ambidextrous         Drug allergies:        [] Yes   [x] No  which?......     Urticaria/Angioedema  [] Yes   [x] No .........  Food Allergy:  [] Yes   [] No  Which?......?? told in past fruit based on rash morphology but didn't under go testing   Pets :  [] Yes   [x] No  which?......         []  Rhinitis   [] Conjunctivitis   [] Sinusitis   [] Polyposis   [x] Otitis (ear itchiness and cough; usually in the Spring; ?seasonal allergies)  [] Pharyngitis         []  Postnasal drip    []  none  Operations:   [] Tonsils   [] Septum   [] Sinus   [] Polyposis        [] Asthma bronchiale   [] Coughing      [x]  none  Symptoms (mostly Rhinoconjunctivitis and Asthma) aggravated by:  Season   [] I   [] II   [] III   [] IV   []V   []VI   []VII   []VIII   []IX   []X   []XI   []XII     [] perennial   Day time      [] morning   [] noon      [] evening        [] night    [] whole day........  []  none  Location/changes    [] inside        [] outside   []  mountains    [] sea     [] others.............   []  none  Triggers, specific     [] animals     [] plants     [] dust              [] others ...........................    []  none  Triggers, others       [] work          [] psyche    [] sport            [] others .............................  []  none  Irritant                [] phys efforts [] smoke    [] heat/cold     [] odors  []others............... []  none    Order for PATCH TESTS  Reason for tests (suspected allergy):  Facial dermatitis  Known previous allergies:   Standardized panels  [x] Standard panel (40 tests)  [x] Preservatives & Antimicrobials (31 tests)  [x] Emulsifiers & Additives (25 tests)   [x] Perfumes/Flavours & Plants (25 tests)  [] Hairdresser panel (12 tests)  [] Rubber Chemicals (22 tests)  [] Plastics (26 tests)  [] Colorants/Dyes/Food additives (20 tests)  [] Metals (implants/dental) (24 tests)  [] Local anaesthetics/NSAIDs (13 tests)  [] Antibiotics & Antimycotics (14 tests)   [] Corticosteroids (15 tests)   [] Photopatch test (62 tests)   [] others: ...      [] Patient's own products: ...    DO NOT test if chemical or biological identity is unknown!     always ask from patient the product information and safety sheets (MSDS)       Order for PRICK TESTS    Reason for tests (suspected allergy): atopic predisposition (childhood eczema, seasonal ear itchiness in Spring)  Known previous allergies: none    Standardized prick panels  [x] Atopic panel (20 tests)  [] Pediatric Panel (12 tests)  [] Milk, Meat, Eggs, Grains (20 tests)   [] Dust, Epithelia, Feathers (10 tests)  [] Fish, Seafood, Shellfish (17 tests)  [] Nuts, Beans (14 tests)  [] Spice, Vegetable, Fruit (17 tests)  [] Pollen Panel = Tree, Grass, Weed (24 tests)  [] Others: ...      [] Patient's own products: ...    DO NOT test if chemical or biological identity is unknown!     always ask from patient the product information and safety sheets (MSDS)     Standardized intradermal  tests  [x] Penicillium notatum [x] Aspergillus fumigatus [x] House dust mites D.far & D. pteron  [] Cat    [] dog  [x] Others: Alternaria  [] Bee venom   [] Wasp venom  !!Specific protocol with dilutions!!     Atopy Screen (Placed Apr 28, 2023)  No Substance Readings (15 min) Evaluation   POS Histamine 1mg/ml ++    NEG NaCl 0.9% -      No Substance Readings (15 min) Evaluation   1 Alternaria alternata (tenuis)  -    2 Cladosporium herbarum -    3 Aspergillus fumigatus -    4 Penicillium notatum -    5 Dermatophagoides pteronyssinus -    6 Dermatophagoides farinae -    7 Dog epithelium (canis spp) -    8 Cat hair (nelly catus) -    9 Cockroach   (Blatella americana & germanica) -    10 Grass mix midwest   ([place]sley, Orchard, Redtop, Rodrigo) -    11 Esteban grass (sorghum halepense) -    12 Weed mix   (common Cocklebur, Lamb s quarters, rough redroot Pigweed, Dock/Sorrel) -    13 Mug wort (artemisia vulgare) -    14 Ragweed giant/short (ambrosia spp) -    15 White birch (Betula papyrifera) -    16 Tree mix 1 (Pecan, Maple BHR, Oak RVW, american [place], black [place]) -    17 Red cedar (juniperus virginia) (+)    18 Tree mix 2   (white Fili, river/red Birch, black [place], common Mayer, american Elm) -    19 Box elder/Maple mix (acer spp) -    20 [place]amance shagbark (carya ovata) -           Conclusion      Intradermal Testing (Placed Apr 28, 2023)  No Substance Conc.  Reading (15min)  immediate Papule [mm] / Erythema [mm] Reading   (... days)  delayed Papule [mm] / Erythema [mm] Remarks   DF Standard Dust Mite - D. Farinae 1:10 -   -    DP Standard Dust Mite - D. Pteronyssinus 1:10 -   -    A Aspergillus fumigatus  1:10 +/++ 7/12  -    P Penicillium notatum 1:10 -   -     Alternaria alt 1:10 -   -      1:10 -   -    Conclusions: immediate type reaction to mold Aspergillus, not to dust mites. No delayed reactions observed.    ________________________________  RESULTS & EVALUATION of PATCH TESTS    Jun 26, 2023  application of patch tests:    Patch test readings after     [x] 2 days, [] 3 days [x] 4 days, [] 5 days,  Other duration: ...      STANDARD Series                                          # Substance 2 days 4 days remarks     1 Mario Mix [C] - -       2 Colophony - -       3  2-Mercaptobenzothiazole  - -       4 Methylisothiazolinone - -       5 Carba Mix - -       6 Thiuram Mix [A] - -       7 Bisphenol A Epoxy Resin - -       8 S-Qpha-Znmdcxftvkq-Formaldehyde Resin - -       9 Mercapto Mix [A] - -       10 Black Rubber Mix- PPD [B] - -       11 Potassium Dichromate  -  -       12 Balsam of [place] (Myroxylon Pereirae Resin) - -       13 Nickel Sulphate Hexahydrate - -       14 Mixed Dialkyl Thiourea - -       15 Paraben Mix [B] - -       16 Methyldibromo Glutaronitrile - -       17 Fragrance Mix 8% - -       18 2-Bromo-2-Nitropropane-1,3-Diol (Bronopol) CT - -       19 Lyral - -       20 Tixocortol-21- Pivalate CT - -       21 Diazolidinyl urea (Germall II) - -        22 Methyl Methacrylate - -       23 Cobalt (II) Chloride Hexahydrate - -       24 Fragrance Mix II  - -       25 Compositae Mix - -       26 Benzoyl Peroxide - -       27 Bacitracin - -       28 Formaldehyde - -       29 Methylchloroisothiazolinone / Methylisothiazolinone - -       30 Corticosteroid Mix CT - -       31 Sodium Lauryl Sulfate - -       32 Lanolin Alcohol - -       33 Turpentine - -       34 Cetylstearylalcohol - -       35 Chlorhexidine Dicluconate - -       36 Budesonide - -       37 Imidazolidinyl Urea  - -       38 Ethyl-2 Cyanoacrylate - -       39 Quaternium 15 (Dowicil 200) - -       40 Decyl Glucoside - -       PRESERVATIVES & ANTIMICROBIALS        # Substance 2 days 4 days remarks   41 1  1,2-Benzisothiazoline-3-One, Sodium Salt - -     2  1,3,5-Roel (2-Hydroxyethyl) - Hexahydrotriazine (Grotan BK) - -     3 7-Fdnoelxghddzl-7-Nitro-1, 3-Propanediol - -     4  3, 4, 4' - Triclocarban - -    45 5 4 - Chloro - 3 - Cresol - -     6 4  - Chloro - 4 - Xylenol (PCMX) - -     7 7-Ethylbicyclooxazolidine (Bioban [id number]) - -     8 Benzalkonium Chloride CT - -     9 Benzyl Alcohol - -    50 10 Cetalkonium Chloride - -     11 Cetylpyrimidine Chloride  - -     12 Chloroacetamide - -     13 DMDM Hydantoin - -     14 Glutaraldehyde - -    55 15 Triclosan - -     16 Glyoxal Trimeric Dihydrate - -     17 Iodopropynyl Butylcarbamate - -     18 Octylisothiazoline - -     19 Bithionol CT - -    60 20 Bioban P 1487 (Nitrobutyl) Morpholine/(Ethylnitro-Trimethylene) Dimorpholine - -     21 Phenoxyethanol - -     22 Phenyl Salicylate - -     23 Povidone Iodine - -     24 Sodium Benzoate - -    65 25 Sodium Disulfite - -     26 Sorbic Acid - -     27 Thimerosal - -     28 Melamine Formaldehyde Resin - -     29 Ethylenediamine Dihydrochloride - -      Parabens      70 30 Butyl-P-Hydroxybenzoate - -     31 Ethyl-P-Hydroxybenzoate - -     32 Methyl-P-Hydroxybenzoate - -    73 33 Propyl-P-Hydroxybenzoate - -      EMULSIFIERS & ADDITIVES       # Substance 2 days 4 days remarks   74 1 Polyethylene Glycol-400 - -    75 2 Cocamidopropyl Betaine - -     3 Amerchol L101 - -     4 Propylene Glycol - -     5 Triethanolamine - -     6 Sorbitane Sesquiolate CT - -    80 7 Isopropylmyristate - -     8 Polysorbate 80 CT - -     9 Amidoamine   (Stearamidopropyl Dimethylamine) - -     10 Oleamidopropyl Dimethylamine - -     11 Lauryl Glucoside - -    85 12 Coconut Diethanolamide  - -     13 2-Hydroxy-4-Methoxy Benzophenone (Oxybenzone) - -     14 Benzophenone-4 (Sulisobenzon) - -     15 Propolis - -     16 Dexpanthenol - -    90 17 Abitol - -     18 Tert-Butylhydroquinone - -     19 Benzyl Salicylate - -     20 Dimethylaminopropylamin (DMPA) - -     21 Zinc Pyrithione (Zinc Omadine)  - -    95 22 Roel(Hydroxymethyl) Nitromethane  - -      Antioxidant       23 Dodecyl Gallate - -     24 Butylhydroxyanisole (BHA) - -     25 Butylhydroxytoluene (BHT) - -     26 Di-Alpha-Tocopherol (Vit  E) - -    100 27 Propyl Gallate - -      PERFUMES, FLAVORS & PLANTS        # Substance 2 days 4 days remarks   101 1 Benzyl Cinnamate - -     2 Di-Limonene (Dipentene) - -     3 Cananga Odorata (Kan Ylang) (I) - -     4 Lichen Acid Mix - -    105 5 Mentha Piperita Oil (Peppermint Oil) - -     6 Sesquiterpenelactone mix - -     7 Tea Tree Oil, Oxidized - -     8 Wood Tar Mix - -     9 Abietic Acid - -    110 10 Lavendula Angustifolia Oil (Lavender Oil) - -     11 Fragrance mix II CT * 14% - -      Fragrance Mix I       12 Oakmoss Absolute - -     13 Eugenol - -     14 Geraniol - -    115 15 Hydroxycitronellal - -     16 Isoeugenol - -     17 Cinnamic Aldehyde - -     18 Cinnamic Alcohol  - -      Fragrance mix II       19 Citronellol - -    120 20 Alpha-Hexylcinnamic Aldehyde    - -     21 Citral - -     22 Farnesol - -    123 23 Coumarin - -    Hexylcinnamic aldehyde, Coumarin, Farnesol, Hydroxyisohexy3-cyclohexene carboxaldehyde, citral, citrolellol    Results of patch tests:                         Interpretation:  - Negative                    A    = Allergic      (+) Erythema    TI   = Toxic/irritant   + E + Infiltration    RaP = Relevance at Present     ++ E/I + Papulovesicle   Rpr  = Relevance Previously     +++ E/I/P + Blister     nR   = No Relevance    [] No relevant allergic reaction observed    [] Allergic reaction diagnosed against following allergens:      Interpretation/ remarks:   See later    [] Patient information given   [] ACDS CAMP information's (# ....) to following compounds: .....   [] General information's to following compounds: ......      Assessment & Plan:    ==> Final Diagnosis:     # Recurrent eczematous facial eruption  * chronic complicated illness  DDx perioral dermatitis/rosacea vs atopic dermatitis vs allergic contact dermatitis   - can continue tacrolimus ointment BID PRN from Dr. Guan   - discussed option of patch testing today. Patient not sure if she wants to pursue patch  testing at this time.     # Atopic predisposition with    childhood eczema    seasonal ear itchiness in Spring = mold allergy (Aspergillus)  * chronic stable illness    These conclusions are made at the best of one's knowledge and belief based on the provided evidence such as patient's history and allergy test results and they can change over time or can be incomplete because of missing information's.    ==> Treatment Plan:  - tacrolimus ointment BID PRN for facial eruption (prescribed by Dr. Guan)        Procedures Performed: Allergy tests, including prick, intradermal and patch tests and drug allergy or provocation tests***    {[email or web address]}: provider    Follow-up in Derm-Allergy clinic for 1st readings of patch tests after 2 days and 2nd readings and final conclusions after 4 days   ___________________________    I spent a total of *** minutes with Jessica Styles during today s  visit. This time was spent discussing all the individual test results, correlating them to the clinical relevance, counseling the patient and/or coordinating care and performing allergy tests such as ..... or procedures.            Again, thank you for allowing me to participate in the care of your patient.        Sincerely,        Dwight Juan MD

## 2023-06-28 ENCOUNTER — OFFICE VISIT (OUTPATIENT)
Dept: ALLERGY | Facility: CLINIC | Age: 23
End: 2023-06-28
Payer: COMMERCIAL

## 2023-06-28 DIAGNOSIS — Z91.048 ALLERGY TO MOLD: ICD-10-CM

## 2023-06-28 DIAGNOSIS — L30.9 FACIAL DERMATITIS: Primary | ICD-10-CM

## 2023-06-28 DIAGNOSIS — L20.89 OTHER ATOPIC DERMATITIS: ICD-10-CM

## 2023-06-28 DIAGNOSIS — L23.89 ALLERGIC CONTACT DERMATITIS DUE TO OTHER AGENTS: ICD-10-CM

## 2023-06-28 PROCEDURE — 99207 PR NO CHARGE LOS: CPT | Performed by: DERMATOLOGY

## 2023-06-28 NOTE — LETTER
6/28/2023         RE: Jessica Styles  [address]  [place] [address]        Dear Colleague,    Thank you for referring your patient, Jessica Styles, to the [facility] [place]. Please see a copy of my visit note below.    [facility] Dermato-allergology Note  Office visit  Encounter Date: Jun 28, 2023  ____________________________________________    CC: No chief complaint on file.      HPI:  (Jun 28, 2023)  Ms. Jessica Styles is a(n) 22 year old female who presents today as a return patient for allergy consultation  - Follow-up in Derm-Allergy clinic for 1st readings of patch tests after 2 days   - otherwise feeling well in usual state of health    Physical exam:  General: In no acute distress, well-developed, well-nourished  Eyes: no conjunctivitis  ENT: no signs of rhinitis   Pulmonary: no wheezing or coughing  Skin:Focused examination of the skin on test sites was performed = see test results below    Earlier History and Allergy exams:  (Jun 26, 2023)  - Follow-up in Derm-Allergy clinic for patch tests in July. If any delayed reaction to the IDT we will see patient earlier and then discuss if patch tests necessary    Earlier History and Allergy exams:  (Apr 28, 2023)  - Oct 2022, had perioral dermatitis, treated with doxy and this helped a lot and also elidel   - around Dec 2022, started to get eczema around eyes; went away within a few months with hydrocortisone   - then a few months ago, had eczema all over face. Treated this tacrolimus cream starting a few weeks ago and this helped but still active  - wondering if it could be any of her products causing the recurrent facial eczema    - denies any new products  - current facial products: Sunscreen and moisturizer; makeup occasionally   - current shampoo: Elder brand   - denies rash anywhere else on the body     - ill-defined scaly faint patches on bilateral upper eyelids and lateral cheeks       Past Medical History:    Patient Active Problem List   Diagnosis     Acne vulgaris     Recurrent herpes labialis     No past medical history on file.    Allergies:  No Known Allergies    Medications:  Current Outpatient Medications   Medication     tacrolimus (PROTOPIC) 0.1 % external ointment     valACYclovir (VALTREX) 1000 mg tablet     valACYclovir (VALTREX) 1000 mg tablet     valACYclovir (VALTREX) 1000 mg tablet     No current facility-administered medications for this visit.       Social History:  The patient is a student. Denies new hobbies.     Family History:  Family History   Problem Relation Age of Onset     No Known Problems Mother      No Known Problems Father      Breast Cancer Maternal Grandmother      No Known Problems Maternal Grandfather      No Known Problems Paternal Grandmother      No Known Problems Paternal Grandfather      No Known Problems Brother      No Known Problems Sister      No Known Problems Son      No Known Problems Daughter      No Known Problems Maternal Half-Brother      No Known Problems Maternal Half-Sister      No Known Problems Paternal Half-Brother      No Known Problems Paternal Half-Sister      No Known Problems Niece      No Known Problems Nephew      No Known Problems Cousin      No Known Problems Other      Cancer No family hx of      Diabetes No family hx of      Coronary Artery Disease No family hx of      Heart Disease No family hx of      Hypertension No family hx of      Hyperlipidemia No family hx of      Kidney Disease No family hx of      Cerebrovascular Disease No family hx of      Obesity No family hx of      Thrombosis No family hx of      Asthma No family hx of      Arthritis No family hx of      Thyroid Disease No family hx of      Depression No family hx of      Mental Illness No family hx of      Substance Abuse No family hx of      Cystic Fibrosis No family hx of      Early Death No family hx of      Coronary Artery Disease Early Onset No family hx of      Heart Failure No  family hx of      Bleeding Diathesis No family hx of      Dementia No family hx of      Ovarian Cancer No family hx of      Uterine Cancer No family hx of      Prostate Cancer No family hx of      Colorectal Cancer No family hx of      Pancreatic Cancer No family hx of      Lung Cancer No family hx of      Melanoma No family hx of      Autoimmune Disease No family hx of      Unknown/Adopted No family hx of      Genetic Disorder No family hx of        Previous Labs, Allergy Tests, Dermatopathology, Imaging:  none    Referred By: No referring provider defined for this encounter.     Allergy Tests:    Past Allergy Test    Order for Future Allergy Testing:    [x] Outpatient  [] Inpatient: [facility]..../ Bed ....       Skin Atopy (atopic dermatitis) [x] Yes   [] No .........hx of childhood eczema and more recent facial eczema  Contact allergies:   [] Yes   [] No ..........?? told in past allergy to toothpaste and fruit based on rash morphology but didn't under go testing   Hand eczema:   [] Yes   [x] No           Leading hand:   [] R   [] L       [] Ambidextrous         Drug allergies:        [] Yes   [x] No  which?......     Urticaria/Angioedema  [] Yes   [x] No .........  Food Allergy:  [] Yes   [] No  Which?......?? told in past fruit based on rash morphology but didn't under go testing   Pets :  [] Yes   [x] No  which?......         []  Rhinitis   [] Conjunctivitis   [] Sinusitis   [] Polyposis   [x] Otitis (ear itchiness and cough; usually in the Spring; ?seasonal allergies)  [] Pharyngitis         []  Postnasal drip    []  none  Operations:   [] Tonsils   [] Septum   [] Sinus   [] Polyposis        [] Asthma bronchiale   [] Coughing      [x]  none  Symptoms (mostly Rhinoconjunctivitis and Asthma) aggravated by:  Season   [] I   [] II   [] III   [] IV   []V   []VI   []VII   []VIII   []IX   []X   []XI   []XII     [] perennial   Day time      [] morning   [] noon      [] evening        [] night    [] whole day........  []   none  Location/changes    [] inside        [] outside   [] mountains    [] sea     [] others.............   []  none  Triggers, specific     [] animals     [] plants     [] dust              [] others ...........................    []  none  Triggers, others       [] work          [] psyche    [] sport            [] others .............................  []  none  Irritant                [] phys efforts [] smoke    [] heat/cold     [] odors  []others............... []  none    Order for PATCH TESTS  Reason for tests (suspected allergy):  Facial dermatitis  Known previous allergies:   Standardized panels  [x] Standard panel (40 tests)  [x] Preservatives & Antimicrobials (31 tests)  [x] Emulsifiers & Additives (25 tests)   [x] Perfumes/Flavours & Plants (25 tests)  [] Hairdresser panel (12 tests)  [] Rubber Chemicals (22 tests)  [] Plastics (26 tests)  [] Colorants/Dyes/Food additives (20 tests)  [] Metals (implants/dental) (24 tests)  [] Local anaesthetics/NSAIDs (13 tests)  [] Antibiotics & Antimycotics (14 tests)   [] Corticosteroids (15 tests)   [] Photopatch test (62 tests)   [] others: ...      [] Patient's own products: ...    DO NOT test if chemical or biological identity is unknown!     always ask from patient the product information and safety sheets (MSDS)       Order for PRICK TESTS    Reason for tests (suspected allergy): atopic predisposition (childhood eczema, seasonal ear itchiness in Spring)  Known previous allergies: none    Standardized prick panels  [x] Atopic panel (20 tests)  [] Pediatric Panel (12 tests)  [] Milk, Meat, Eggs, Grains (20 tests)   [] Dust, Epithelia, Feathers (10 tests)  [] Fish, Seafood, Shellfish (17 tests)  [] Nuts, Beans (14 tests)  [] Spice, Vegetable, Fruit (17 tests)  [] Pollen Panel = Tree, Grass, Weed (24 tests)  [] Others: ...      [] Patient's own products: ...    DO NOT test if chemical or biological identity is unknown!     always ask from patient the product  information and safety sheets (MSDS)     Standardized intradermal tests  [x] Penicillium notatum [x] Aspergillus fumigatus [x] House dust mites D.far & D. pteron  [] Cat    [] dog  [x] Others: Alternaria  [] Bee venom   [] Wasp venom  !!Specific protocol with dilutions!!       Atopy Screen (Placed Apr 28, 2023)  No Substance Readings (15 min) Evaluation   POS Histamine 1mg/ml ++    NEG NaCl 0.9% -      No Substance Readings (15 min) Evaluation   1 Alternaria alternata (tenuis)  -    2 Cladosporium herbarum -    3 Aspergillus fumigatus -    4 Penicillium notatum -    5 Dermatophagoides pteronyssinus -    6 Dermatophagoides farinae -    7 Dog epithelium (canis spp) -    8 Cat hair (nelly catus) -    9 Cockroach   (Blatella americana & germanica) -    10 Grass mix midwest   ([place]sley, Orchard, Redtop, Rodrigo) -    11 Esteban grass (sorghum halepense) -    12 Weed mix   (common Cocklebur, Lamb s quarters, rough redroot Pigweed, Dock/Sorrel) -    13 Mug wort (artemisia vulgare) -    14 Ragweed giant/short (ambrosia spp) -    15 White birch (Betula papyrifera) -    16 Tree mix 1 (Pecan, Maple BHR, Oak RVW, american [place], black [place]) -    17 Red cedar (juniperus virginia) (+)    18 Tree mix 2   (white Fili, river/red Birch, black [place], common [place], american Elm) -    19 Box elder/Maple mix (acer spp) -    20 Chester shagbark (carya ovata) -           Conclusion      Intradermal Testing (Placed Apr 28, 2023)  No Substance Conc.  Reading (15min)  immediate Papule [mm] / Erythema [mm] Reading   (... days)  delayed Papule [mm] / Erythema [mm] Remarks   DF Standard Dust Mite - D. Farinae 1:10 -   -    DP Standard Dust Mite - D. Pteronyssinus 1:10 -   -    A Aspergillus fumigatus  1:10 +/++ 7/12  -    P Penicillium notatum 1:10 -   -     Alternaria alt 1:10 -   -      1:10 -   -    Conclusions: immediate type reaction to mold Aspergillus, not to dust mites. No delayed reactions  observed.    ________________________________  RESULTS & EVALUATION of PATCH TESTS    Jun 26, 2023 application of patch tests:    Patch test readings after     [x] 2 days, [] 3 days [x] 4 days, [] 5 days,  Other duration: ...      STANDARD Series                                          # Substance 2 days 4 days remarks     1 Mario Mix [C] - -       2 Colophony +/++ -       3  2-Mercaptobenzothiazole  - -       4 Methylisothiazolinone - -       5 Carba Mix - -       6 Thiuram Mix [A] - -       7 Bisphenol A Epoxy Resin - -       8 X-Syrb-Oqxhhbmycww-Formaldehyde Resin - -       9 Mercapto Mix [A] - -       10 Black Rubber Mix- PPD [B] - -       11 Potassium Dichromate  -  -       12 Balsam of [place] (Myroxylon Pereirae Resin) - -       13 Nickel Sulphate Hexahydrate - -       14 Mixed Dialkyl Thiourea - -       15 Paraben Mix [B] - -       16 Methyldibromo Glutaronitrile - -       17 Fragrance Mix 8% - -       18 2-Bromo-2-Nitropropane-1,3-Diol (Bronopol) CT - -       19 Lyral - -       20 Tixocortol-21- Pivalate CT - -       21 Diazolidinyl urea (Germall II) - -        22 Methyl Methacrylate - -       23 Cobalt (II) Chloride Hexahydrate - -       24 Fragrance Mix II  - -       25 Compositae Mix - -       26 Benzoyl Peroxide - -       27 Bacitracin - -       28 Formaldehyde - -       29 Methylchloroisothiazolinone / Methylisothiazolinone - -       30 Corticosteroid Mix CT - -       31 Sodium Lauryl Sulfate - -       32 Lanolin Alcohol - -       33 Turpentine - -       34 Cetylstearylalcohol - -       35 Chlorhexidine Dicluconate - -       36 Budesonide - -       37 Imidazolidinyl Urea  - -       38 Ethyl-2 Cyanoacrylate - -       39 Quaternium 15 (Dowicil 200) - -       40 Decyl Glucoside - -       PRESERVATIVES & ANTIMICROBIALS        # Substance 2 days 4 days remarks   41 1  1,2-Benzisothiazoline-3-One, Sodium Salt - -     2  1,3,5-Roel (2-Hydroxyethyl) - Hexahydrotriazine (Grotan BK) - -     3  9-Sxznabqkpwxxa-7-Nitro-1, 3-Propanediol - -     4  3, 4, 4' - Triclocarban - -    45 5 4 - Chloro - 3 - Cresol - -     6 4 - Chloro - 4 - Xylenol (PCMX) - -     7 7-Ethylbicyclooxazolidine (Bioban US5420) - -     8 Benzalkonium Chloride CT - -     9 Benzyl Alcohol - -    50 10 Cetalkonium Chloride - -     11 Cetylpyrimidine Chloride  - -     12 Chloroacetamide - -     13 DMDM Hydantoin - -     14 Glutaraldehyde - -    55 15 Triclosan - -     16 Glyoxal Trimeric Dihydrate - -     17 Iodopropynyl Butylcarbamate - -     18 Octylisothiazoline - -     19 Bithionol CT - -    60 20 Bioban P 1487 (Nitrobutyl) Morpholine/(Ethylnitro-Trimethylene) Dimorpholine - -     21 Phenoxyethanol - -     22 Phenyl Salicylate - -     23 Povidone Iodine color -     24 Sodium Benzoate - -    65 25 Sodium Disulfite (+) -     26 Sorbic Acid - -     27 Thimerosal - -     28 Melamine Formaldehyde Resin - -     29 Ethylenediamine Dihydrochloride - -      Parabens      70 30 Butyl-P-Hydroxybenzoate - -     31 Ethyl-P-Hydroxybenzoate - -     32 Methyl-P-Hydroxybenzoate - -    73 33 Propyl-P-Hydroxybenzoate - -      EMULSIFIERS & ADDITIVES       # Substance 2 days 4 days remarks   74 1 Polyethylene Glycol-400 - -    75 2 Cocamidopropyl Betaine - -     3 Amerchol L101 - -     4 Propylene Glycol - -     5 Triethanolamine - -     6 Sorbitane Sesquiolate CT - -    80 7 Isopropylmyristate - -     8 Polysorbate 80 CT - -     9 Amidoamine   (Stearamidopropyl Dimethylamine) - -     10 Oleamidopropyl Dimethylamine - -     11 Lauryl Glucoside - -    85 12 Coconut Diethanolamide  - -     13 2-Hydroxy-4-Methoxy Benzophenone (Oxybenzone) - -     14 Benzophenone-4 (Sulisobenzon) - -     15 Propolis +/++ -     16 Dexpanthenol - -    90 17 Abitol + -     18 Tert-Butylhydroquinone - -     19 Benzyl Salicylate - -     20 Dimethylaminopropylamin (DMPA) - -     21 Zinc Pyrithione (Zinc Omadine)  - -    95 22 Roel(Hydroxymethyl) Nitromethane  - -      Antioxidant        23 Dodecyl Gallate - -     24 Butylhydroxyanisole (BHA) - -     25 Butylhydroxytoluene (BHT) - -     26 Di-Alpha-Tocopherol (Vit E) - -    100 27 Propyl Gallate - -      PERFUMES, FLAVORS & PLANTS        # Substance 2 days 4 days remarks   101 1 Benzyl Cinnamate - -     2 Di-Limonene (Dipentene) - -     3 Cananga Odorata (Kan Omer) (I) - -     4 Lichen Acid Mix - -    105 5 Mentha Piperita Oil (Peppermint Oil) + -     6 Sesquiterpenelactone mix - -     7 Tea Tree Oil, Oxidized - -     8 Wood Tar Mix (+) -     9 Abietic Acid - -    110 10 Lavendula Angustifolia Oil (Lavender Oil) - -     11 Fragrance mix II CT * 14% - -      Fragrance Mix I       12 [place]moss Absolute - -     13 Eugenol - -     14 Geraniol - -    115 15 Hydroxycitronellal - -     16 Isoeugenol - -     17 Cinnamic Aldehyde - -     18 Cinnamic Alcohol  - -      Fragrance mix II       19 Citronellol - -    120 20 Alpha-Hexylcinnamic Aldehyde    - -     21 Citral - -     22 Farnesol - -    123 23 Coumarin - -    Hexylcinnamic aldehyde, Coumarin, Farnesol, Hydroxyisohexy3-cyclohexene carboxaldehyde, citral, citrolellol    Results of patch tests:                         Interpretation:  - Negative                    A    = Allergic      (+) Erythema    TI   = Toxic/irritant   + E + Infiltration    RaP = Relevance at Present     ++ E/I + Papulovesicle   Rpr  = Relevance Previously     +++ E/I/P + Blister     nR   = No Relevance    [] No relevant allergic reaction observed    [x] Allergic reaction diagnosed against following allergens:       Fragrances/adhesives/disinfectants: +/++ Colophony, + Abitol, +/++ Propolis    Fragrance/flavor: + peppermint oil      Interpretation/ remarks:   See later    [] Patient information given   [] ACDS CAMP information's (# ....) to following compounds: .....   [] General information's to following compounds: ......      Assessment & Plan:    ==> Final Diagnosis:     # Recurrent eczematous facial eruption  * chronic  complicated illness  DDx perioral dermatitis/rosacea vs atopic dermatitis vs allergic contact dermatitis   - can continue tacrolimus ointment BID PRN from Dr. Guan   - discussed option of patch testing today. Patient not sure if she wants to pursue patch testing at this time.     # Atopic predisposition with    childhood eczema    seasonal ear itchiness in Spring = mold allergy (Aspergillus)  * chronic stable illness    These conclusions are made at the best of one's knowledge and belief based on the provided evidence such as patient's history and allergy test results and they can change over time or can be incomplete because of missing information's.    ==> Treatment Plan:  - tacrolimus ointment BID PRN for facial eruption (prescribed by Dr. Guan)   ___________________________    Staff: : provider    Follow-up in Derm-Allergy clinic for 2nd readings and final conclusions after 4 days   ___________________________    I spent a total of 20 minutes with Jessica Styles during today s  visit. This time was spent discussing all the individual test results, correlating them to the clinical relevance, counseling the patient and/or coordinating care        Again, thank you for allowing me to participate in the care of your patient.        Sincerely,        Dwight Juan MD

## 2023-06-28 NOTE — PROGRESS NOTES
[facility] Dermato-allergology Note  Office visit  Encounter Date: Jun 28, 2023  ____________________________________________    CC: No chief complaint on file.      HPI:  (Jun 28, 2023)  Ms. Jessica Styles is a(n) 22 year old female who presents today as a return patient for allergy consultation  - Follow-up in Derm-Allergy clinic for 1st readings of patch tests after 2 days   - otherwise feeling well in usual state of health    Physical exam:  General: In no acute distress, well-developed, well-nourished  Eyes: no conjunctivitis  ENT: no signs of rhinitis   Pulmonary: no wheezing or coughing  Skin:Focused examination of the skin on test sites was performed = see test results below    Earlier History and Allergy exams:  (Jun 26, 2023)  - Follow-up in Derm-Allergy clinic for patch tests in July. If any delayed reaction to the IDT we will see patient earlier and then discuss if patch tests necessary    Earlier History and Allergy exams:  (Apr 28, 2023)  - Oct 2022, had perioral dermatitis, treated with doxy and this helped a lot and also elidel   - around Dec 2022, started to get eczema around eyes; went away within a few months with hydrocortisone   - then a few months ago, had eczema all over face. Treated this tacrolimus cream starting a few weeks ago and this helped but still active  - wondering if it could be any of her products causing the recurrent facial eczema    - denies any new products  - current facial products: Sunscreen and moisturizer; makeup occasionally   - current shampoo: Elder brand   - denies rash anywhere else on the body     - ill-defined scaly faint patches on bilateral upper eyelids and lateral cheeks       Past Medical History:   Patient Active Problem List   Diagnosis     Acne vulgaris     Recurrent herpes labialis     No past medical history on file.    Allergies:  No Known Allergies    Medications:  Current Outpatient Medications   Medication     tacrolimus (PROTOPIC)  0.1 % external ointment     valACYclovir (VALTREX) 1000 mg tablet     valACYclovir (VALTREX) 1000 mg tablet     valACYclovir (VALTREX) 1000 mg tablet     No current facility-administered medications for this visit.       Social History:  The patient is a student. Denies new hobbies.     Family History:  Family History   Problem Relation Age of Onset     No Known Problems Mother      No Known Problems Father      Breast Cancer Maternal Grandmother      No Known Problems Maternal Grandfather      No Known Problems Paternal Grandmother      No Known Problems Paternal Grandfather      No Known Problems Brother      No Known Problems Sister      No Known Problems Son      No Known Problems Daughter      No Known Problems Maternal Half-Brother      No Known Problems Maternal Half-Sister      No Known Problems Paternal Half-Brother      No Known Problems Paternal Half-Sister      No Known Problems Niece      No Known Problems Nephew      No Known Problems Cousin      No Known Problems Other      Cancer No family hx of      Diabetes No family hx of      Coronary Artery Disease No family hx of      Heart Disease No family hx of      Hypertension No family hx of      Hyperlipidemia No family hx of      Kidney Disease No family hx of      Cerebrovascular Disease No family hx of      Obesity No family hx of      Thrombosis No family hx of      Asthma No family hx of      Arthritis No family hx of      Thyroid Disease No family hx of      Depression No family hx of      Mental Illness No family hx of      Substance Abuse No family hx of      Cystic Fibrosis No family hx of      Early Death No family hx of      Coronary Artery Disease Early Onset No family hx of      Heart Failure No family hx of      Bleeding Diathesis No family hx of      Dementia No family hx of      Ovarian Cancer No family hx of      Uterine Cancer No family hx of      Prostate Cancer No family hx of      Colorectal Cancer No family hx of      Pancreatic  Cancer No family hx of      Lung Cancer No family hx of      Melanoma No family hx of      Autoimmune Disease No family hx of      Unknown/Adopted No family hx of      Genetic Disorder No family hx of        Previous Labs, Allergy Tests, Dermatopathology, Imaging:  none    Referred By: No referring provider defined for this encounter.     Allergy Tests:    Past Allergy Test    Order for Future Allergy Testing:    [x] Outpatient  [] Inpatient: [facility]..../ Bed ....       Skin Atopy (atopic dermatitis) [x] Yes   [] No .........hx of childhood eczema and more recent facial eczema  Contact allergies:   [] Yes   [] No ..........?? told in past allergy to toothpaste and fruit based on rash morphology but didn't under go testing   Hand eczema:   [] Yes   [x] No           Leading hand:   [] R   [] L       [] Ambidextrous         Drug allergies:        [] Yes   [x] No  which?......     Urticaria/Angioedema  [] Yes   [x] No .........  Food Allergy:  [] Yes   [] No  Which?......?? told in past fruit based on rash morphology but didn't under go testing   Pets :  [] Yes   [x] No  which?......         []  Rhinitis   [] Conjunctivitis   [] Sinusitis   [] Polyposis   [x] Otitis (ear itchiness and cough; usually in the Spring; ?seasonal allergies)  [] Pharyngitis         []  Postnasal drip    []  none  Operations:   [] Tonsils   [] Septum   [] Sinus   [] Polyposis        [] Asthma bronchiale   [] Coughing      [x]  none  Symptoms (mostly Rhinoconjunctivitis and Asthma) aggravated by:  Season   [] I   [] II   [] III   [] IV   []V   []VI   []VII   []VIII   []IX   []X   []XI   []XII     [] perennial   Day time      [] morning   [] noon      [] evening        [] night    [] whole day........  []  none  Location/changes    [] inside        [] outside   [] mountains    [] sea     [] others.............   []  none  Triggers, specific     [] animals     [] plants     [] dust              [] others ...........................    []   none  Triggers, others       [] work          [] psyche    [] sport            [] others .............................  []  none  Irritant                [] phys efforts [] smoke    [] heat/cold     [] odors  []others............... []  none    Order for PATCH TESTS  Reason for tests (suspected allergy):  Facial dermatitis  Known previous allergies:   Standardized panels  [x] Standard panel (40 tests)  [x] Preservatives & Antimicrobials (31 tests)  [x] Emulsifiers & Additives (25 tests)   [x] Perfumes/Flavours & Plants (25 tests)  [] Hairdresser panel (12 tests)  [] Rubber Chemicals (22 tests)  [] Plastics (26 tests)  [] Colorants/Dyes/Food additives (20 tests)  [] Metals (implants/dental) (24 tests)  [] Local anaesthetics/NSAIDs (13 tests)  [] Antibiotics & Antimycotics (14 tests)   [] Corticosteroids (15 tests)   [] Photopatch test (62 tests)   [] others: ...      [] Patient's own products: ...    DO NOT test if chemical or biological identity is unknown!     always ask from patient the product information and safety sheets (MSDS)       Order for PRICK TESTS    Reason for tests (suspected allergy): atopic predisposition (childhood eczema, seasonal ear itchiness in Spring)  Known previous allergies: none    Standardized prick panels  [x] Atopic panel (20 tests)  [] Pediatric Panel (12 tests)  [] Milk, Meat, Eggs, Grains (20 tests)   [] Dust, Epithelia, Feathers (10 tests)  [] Fish, Seafood, Shellfish (17 tests)  [] Nuts, Beans (14 tests)  [] Spice, Vegetable, Fruit (17 tests)  [] Pollen Panel = Tree, Grass, Weed (24 tests)  [] Others: ...      [] Patient's own products: ...    DO NOT test if chemical or biological identity is unknown!     always ask from patient the product information and safety sheets (MSDS)     Standardized intradermal tests  [x] Penicillium notatum [x] Aspergillus fumigatus [x] House dust mites D.far & D. pteron  [] Cat    [] dog  [x] Others: Alternaria  [] Bee venom   [] Wasp venom  !!Specific  protocol with dilutions!!       Atopy Screen (Placed Apr 28, 2023)  No Substance Readings (15 min) Evaluation   POS Histamine 1mg/ml ++    NEG NaCl 0.9% -      No Substance Readings (15 min) Evaluation   1 Alternaria alternata (tenuis)  -    2 Cladosporium herbarum -    3 Aspergillus fumigatus -    4 Penicillium notatum -    5 Dermatophagoides pteronyssinus -    6 Dermatophagoides farinae -    7 Dog epithelium (canis spp) -    8 Cat hair (nelly catus) -    9 Cockroach   (Blatella americana & germanica) -    10 Grass mix midwest   ([place], Orchard, Red[place], Rodrigo) -    11 Esteban grass (sorghum halepense) -    12 Weed mix   (common Cocklebur, Lamb s quarters, rough redroot Pigweed, Dock/Sorrel) -    13 Mug wort (artemisia vulgare) -    14 Ragweed giant/short (ambrosia spp) -    15 White birch (Betula papyrifera) -    16 Tree mix 1 (Pecan, Maple BHR, Oak RVW, american Vul[place], black [place]) -    17 Red cedar (juniperus virginia) (+)    18 Tree mix 2   (white Fili, river/red Birch, black [place], common Grimes, american Elm) -    19 Box elder/Maple mix (acer spp) -    20 Orem shagbark (carya ovata) -           Conclusion      Intradermal Testing (Placed Apr 28, 2023)  No Substance Conc.  Reading (15min)  immediate Papule [mm] / Erythema [mm] Reading   (... days)  delayed Papule [mm] / Erythema [mm] Remarks   DF Standard Dust Mite - D. Farinae 1:10 -   -    DP Standard Dust Mite - D. Pteronyssinus 1:10 -   -    A Aspergillus fumigatus  1:10 +/++ 7/12  -    P Penicillium notatum 1:10 -   -     Alternaria alt 1:10 -   -      1:10 -   -    Conclusions: immediate type reaction to mold Aspergillus, not to dust mites. No delayed reactions observed.    ________________________________  RESULTS & EVALUATION of PATCH TESTS    Jun 26, 2023 application of patch tests:    Patch test readings after     [x] 2 days, [] 3 days [x] 4 days, [] 5 days,  Other duration: ...      STANDARD Series                                           # Substance 2 days 4 days remarks     1 Mario Mix [C] - -       2 Colophony +/++ -       3  2-Mercaptobenzothiazole  - -       4 Methylisothiazolinone - -       5 Carba Mix - -       6 Thiuram Mix [A] - -       7 Bisphenol A Epoxy Resin - -       8 D-Bupo-Cwkusaplisr-Formaldehyde Resin - -       9 Mercapto Mix [A] - -       10 Black Rubber Mix- PPD [B] - -       11 Potassium Dichromate  -  -       12 Balsam of [place] (Myroxylon Pereirae Resin) - -       13 Nickel Sulphate Hexahydrate - -       14 Mixed Dialkyl Thiourea - -       15 Paraben Mix [B] - -       16 Methyldibromo Glutaronitrile - -       17 Fragrance Mix 8% - -       18 2-Bromo-2-Nitropropane-1,3-Diol (Bronopol) CT - -       19 Lyral - -       20 Tixocortol-21- Pivalate CT - -       21 Diazolidinyl urea (Germall II) - -        22 Methyl Methacrylate - -       23 Cobalt (II) Chloride Hexahydrate - -       24 Fragrance Mix II  - -       25 Compositae Mix - -       26 Benzoyl Peroxide - -       27 Bacitracin - -       28 Formaldehyde - -       29 Methylchloroisothiazolinone / Methylisothiazolinone - -       30 Corticosteroid Mix CT - -       31 Sodium Lauryl Sulfate - -       32 Lanolin Alcohol - -       33 Turpentine - -       34 Cetylstearylalcohol - -       35 Chlorhexidine Dicluconate - -       36 Budesonide - -       37 Imidazolidinyl Urea  - -       38 Ethyl-2 Cyanoacrylate - -       39 Quaternium 15 (Dowicil 200) - -       40 Decyl Glucoside - -       PRESERVATIVES & ANTIMICROBIALS        # Substance 2 days 4 days remarks   41 1  1,2-Benzisothiazoline-3-One, Sodium Salt - -     2  1,3,5-Roel (2-Hydroxyethyl) - Hexahydrotriazine (Grotan BK) - -     3 1-Hidcwrcqfsjeb-0-Nitro-1, 3-Propanediol - -     4  3, 4, 4' - Triclocarban - -    45 5 4 - Chloro - 3 - Cresol - -     6 4 - Chloro - 4 - Xylenol (PCMX) - -     7 7-Ethylbicyclooxazolidine (Bioban PU9170) - -     8 Benzalkonium Chloride CT - -     9 Benzyl Alcohol - -    50 10 Cetalkonium Chloride - -      11 Cetylpyrimidine Chloride  - -     12 Chloroacetamide - -     13 DMDM Hydantoin - -     14 Glutaraldehyde - -    55 15 Triclosan - -     16 Glyoxal Trimeric Dihydrate - -     17 Iodopropynyl Butylcarbamate - -     18 Octylisothiazoline - -     19 Bithionol CT - -    60 20 Bioban P 1487 (Nitrobutyl) Morpholine/(Ethylnitro-Trimethylene) Dimorpholine - -     21 Phenoxyethanol - -     22 Phenyl Salicylate - -     23 Povidone Iodine color -     24 Sodium Benzoate - -    65 25 Sodium Disulfite (+) -     26 Sorbic Acid - -     27 Thimerosal - -     28 Melamine Formaldehyde Resin - -     29 Ethylenediamine Dihydrochloride - -      Parabens      70 30 Butyl-P-Hydroxybenzoate - -     31 Ethyl-P-Hydroxybenzoate - -     32 Methyl-P-Hydroxybenzoate - -    73 33 Propyl-P-Hydroxybenzoate - -      EMULSIFIERS & ADDITIVES       # Substance 2 days 4 days remarks   74 1 Polyethylene Glycol-400 - -    75 2 Cocamidopropyl Betaine - -     3 Amerchol L101 - -     4 Propylene Glycol - -     5 Triethanolamine - -     6 Sorbitane Sesquiolate CT - -    80 7 Isopropylmyristate - -     8 Polysorbate 80 CT - -     9 Amidoamine   (Stearamidopropyl Dimethylamine) - -     10 Oleamidopropyl Dimethylamine - -     11 Lauryl Glucoside - -    85 12 Coconut Diethanolamide  - -     13 2-Hydroxy-4-Methoxy Benzophenone (Oxybenzone) - -     14 Benzophenone-4 (Sulisobenzon) - -     15 Propolis +/++ -     16 Dexpanthenol - -    90 17 Abitol + -     18 Tert-Butylhydroquinone - -     19 Benzyl Salicylate - -     20 Dimethylaminopropylamin (DMPA) - -     21 Zinc Pyrithione (Zinc Omadine)  - -    95 22 Roel(Hydroxymethyl) Nitromethane  - -      Antioxidant       23 Dodecyl Gallate - -     24 Butylhydroxyanisole (BHA) - -     25 Butylhydroxytoluene (BHT) - -     26 Di-Alpha-Tocopherol (Vit E) - -    100 27 Propyl Gallate - -      PERFUMES, FLAVORS & PLANTS        # Substance 2 days 4 days remarks   101 1 Benzyl Cinnamate - -     2 Di-Limonene (Dipentene) - -      3 Cananga Odorata (Kan Omer) (I) - -     4 Lichen Acid Mix - -    105 5 Mentha Piperita Oil (Peppermint Oil) + -     6 Sesquiterpenelactone mix - -     7 Tea Tree Oil, Oxidized - -     8 Wood Tar Mix (+) -     9 Abietic Acid - -    110 10 Lavendula Angustifolia Oil (Lavender Oil) - -     11 Fragrance mix II CT * 14% - -      Fragrance Mix I       12 [place]moss Absolute - -     13 Eugenol - -     14 Geraniol - -    115 15 Hydroxycitronellal - -     16 Isoeugenol - -     17 Cinnamic Aldehyde - -     18 Cinnamic Alcohol  - -      Fragrance mix II       19 Citronellol - -    120 20 Alpha-Hexylcinnamic Aldehyde    - -     21 Citral - -     22 Farnesol - -    123 23 Coumarin - -    Hexylcinnamic aldehyde, Coumarin, Farnesol, Hydroxyisohexy3-cyclohexene carboxaldehyde, citral, citrolellol    Results of patch tests:                         Interpretation:  - Negative                    A    = Allergic      (+) Erythema    TI   = Toxic/irritant   + E + Infiltration    RaP = Relevance at Present     ++ E/I + Papulovesicle   Rpr  = Relevance Previously     +++ E/I/P + Blister     nR   = No Relevance    [] No relevant allergic reaction observed    [x] Allergic reaction diagnosed against following allergens:       Fragrances/adhesives/disinfectants: +/++ Colophony, + Abitol, +/++ Propolis    Fragrance/flavor: + peppermint oil      Interpretation/ remarks:   See later    [] Patient information given   [] ACDS CAMP information's (# ....) to following compounds: .....   [] General information's to following compounds: ......      Assessment & Plan:    ==> Final Diagnosis:     # Recurrent eczematous facial eruption  * chronic complicated illness  DDx perioral dermatitis/rosacea vs atopic dermatitis vs allergic contact dermatitis   - can continue tacrolimus ointment BID PRN from Dr. Guan   - discussed option of patch testing today. Patient not sure if she wants to pursue patch testing at this time.     # Atopic predisposition  with    childhood eczema    seasonal ear itchiness in Spring = mold allergy (Aspergillus)  * chronic stable illness    These conclusions are made at the best of one's knowledge and belief based on the provided evidence such as patient's history and allergy test results and they can change over time or can be incomplete because of missing information's.    ==> Treatment Plan:  - tacrolimus ointment BID PRN for facial eruption (prescribed by Dr. Guan)   ___________________________    Staff: : provider    Follow-up in Derm-Allergy clinic for 2nd readings and final conclusions after 4 days   ___________________________    I spent a total of 20 minutes with Jessica Styles during today s  visit. This time was spent discussing all the individual test results, correlating them to the clinical relevance, counseling the patient and/or coordinating care

## 2023-06-28 NOTE — NURSING NOTE
Chief Complaint   Patient presents with     Allergy Recheck     Patch day 3     Hermelinda BALL RN-BSN  Dermatology Surgery  [phone]

## 2023-06-30 ENCOUNTER — OFFICE VISIT (OUTPATIENT)
Dept: ALLERGY | Facility: CLINIC | Age: 23
End: 2023-06-30
Payer: COMMERCIAL

## 2023-06-30 DIAGNOSIS — L30.9 FACIAL DERMATITIS: Primary | ICD-10-CM

## 2023-06-30 DIAGNOSIS — L20.89 OTHER ATOPIC DERMATITIS: ICD-10-CM

## 2023-06-30 DIAGNOSIS — L23.89 ALLERGIC CONTACT DERMATITIS DUE TO OTHER AGENTS: ICD-10-CM

## 2023-06-30 PROCEDURE — 99214 OFFICE O/P EST MOD 30 MIN: CPT | Performed by: DERMATOLOGY

## 2023-06-30 RX ORDER — DESONIDE 0.5 MG/G
CREAM TOPICAL DAILY PRN
Qty: 15 G | Refills: 2 | Status: SHIPPED | OUTPATIENT
Start: 2023-06-30 | End: 2024-07-19

## 2023-06-30 NOTE — NURSING NOTE
Chief Complaint   Patient presents with     Allergy Recheck     Patch day 5     \Hermelinda BALL RN-BSN  Dermatology Surgery  [phone]

## 2023-06-30 NOTE — PROGRESS NOTES
[facility] Dermato-allergology Note  Office visit  Encounter Date: Jun 30, 2023  ____________________________________________    CC: No chief complaint on file.      HPI:  (Jun 30, 2023)  Ms. Jessica Styles is a(n) 22 year old female who presents today as a return patient for allergy consultation  - Follow-up in Derm-Allergy clinic for 2nd readings and final conclusions after 4 days   - otherwise feeling well in usual state of health    Physical exam:  General: In no acute distress, well-developed, well-nourished  Eyes: no conjunctivitis  ENT: no signs of rhinitis   Pulmonary: no wheezing or coughing  Skin:Focused examination of the skin on test sites was performed = see test results below    Earlier History and Allergy exams:  (Jun 28, 2023)  - Follow-up in Derm-Allergy clinic for 1st readings of patch tests after 2 days     Earlier History and Allergy exams:  (Jun 26, 2023)  - Follow-up in Derm-Allergy clinic for patch tests in July. If any delayed reaction to the IDT we will see patient earlier and then discuss if patch tests necessary    Earlier History and Allergy exams:  (Apr 28, 2023)  - Oct 2022, had perioral dermatitis, treated with doxy and this helped a lot and also elidel   - around Dec 2022, started to get eczema around eyes; went away within a few months with hydrocortisone   - then a few months ago, had eczema all over face. Treated this tacrolimus cream starting a few weeks ago and this helped but still active  - wondering if it could be any of her products causing the recurrent facial eczema    - denies any new products  - current facial products: Sunscreen and moisturizer; makeup occasionally   - current shampoo: Elder brand   - denies rash anywhere else on the body     - ill-defined scaly faint patches on bilateral upper eyelids and lateral cheeks       Past Medical History:   Patient Active Problem List   Diagnosis     Acne vulgaris     Recurrent herpes labialis     No past  medical history on file.    Allergies:  No Known Allergies    Medications:  Current Outpatient Medications   Medication     tacrolimus (PROTOPIC) 0.1 % external ointment     valACYclovir (VALTREX) 1000 mg tablet     valACYclovir (VALTREX) 1000 mg tablet     valACYclovir (VALTREX) 1000 mg tablet     No current facility-administered medications for this visit.       Social History:  The patient is a student. Denies new hobbies.     Family History:  Family History   Problem Relation Age of Onset     No Known Problems Mother      No Known Problems Father      Breast Cancer Maternal Grandmother      No Known Problems Maternal Grandfather      No Known Problems Paternal Grandmother      No Known Problems Paternal Grandfather      No Known Problems Brother      No Known Problems Sister      No Known Problems Son      No Known Problems Daughter      No Known Problems Maternal Half-Brother      No Known Problems Maternal Half-Sister      No Known Problems Paternal Half-Brother      No Known Problems Paternal Half-Sister      No Known Problems Niece      No Known Problems Nephew      No Known Problems Cousin      No Known Problems Other      Cancer No family hx of      Diabetes No family hx of      Coronary Artery Disease No family hx of      Heart Disease No family hx of      Hypertension No family hx of      Hyperlipidemia No family hx of      Kidney Disease No family hx of      Cerebrovascular Disease No family hx of      Obesity No family hx of      Thrombosis No family hx of      Asthma No family hx of      Arthritis No family hx of      Thyroid Disease No family hx of      Depression No family hx of      Mental Illness No family hx of      Substance Abuse No family hx of      Cystic Fibrosis No family hx of      Early Death No family hx of      Coronary Artery Disease Early Onset No family hx of      Heart Failure No family hx of      Bleeding Diathesis No family hx of      Dementia No family hx of      Ovarian Cancer  No family hx of      Uterine Cancer No family hx of      Prostate Cancer No family hx of      Colorectal Cancer No family hx of      Pancreatic Cancer No family hx of      Lung Cancer No family hx of      Melanoma No family hx of      Autoimmune Disease No family hx of      Unknown/Adopted No family hx of      Genetic Disorder No family hx of        Previous Labs, Allergy Tests, Dermatopathology, Imaging:  none    Referred By: No referring provider defined for this encounter.     Allergy Tests:    Past Allergy Test    Order for Future Allergy Testing:    [x] Outpatient  [] Inpatient: [facility]..../ Bed ....       Skin Atopy (atopic dermatitis) [x] Yes   [] No .........hx of childhood eczema and more recent facial eczema  Contact allergies:   [] Yes   [] No ..........?? told in past allergy to toothpaste and fruit based on rash morphology but didn't under go testing   Hand eczema:   [] Yes   [x] No           Leading hand:   [] R   [] L       [] Ambidextrous         Drug allergies:        [] Yes   [x] No  which?......     Urticaria/Angioedema  [] Yes   [x] No .........  Food Allergy:  [] Yes   [] No  Which?......?? told in past fruit based on rash morphology but didn't under go testing   Pets :  [] Yes   [x] No  which?......         []  Rhinitis   [] Conjunctivitis   [] Sinusitis   [] Polyposis   [x] Otitis (ear itchiness and cough; usually in the Spring; ?seasonal allergies)  [] Pharyngitis         []  Postnasal drip    []  none  Operations:   [] Tonsils   [] Septum   [] Sinus   [] Polyposis        [] Asthma bronchiale   [] Coughing      [x]  none  Symptoms (mostly Rhinoconjunctivitis and Asthma) aggravated by:  Season   [] I   [] II   [] III   [] IV   []V   []VI   []VII   []VIII   []IX   []X   []XI   []XII     [] perennial   Day time      [] morning   [] noon      [] evening        [] night    [] whole day........  []  none  Location/changes    [] inside        [] outside   [] mountains    [] sea     []  others.............   []  none  Triggers, specific     [] animals     [] plants     [] dust              [] others ...........................    []  none  Triggers, others       [] work          [] psyche    [] sport            [] others .............................  []  none  Irritant                [] phys efforts [] smoke    [] heat/cold     [] odors  []others............... []  none    Order for PATCH TESTS  Reason for tests (suspected allergy):  Facial dermatitis  Known previous allergies:   Standardized panels  [x] Standard panel (40 tests)  [x] Preservatives & Antimicrobials (31 tests)  [x] Emulsifiers & Additives (25 tests)   [x] Perfumes/Flavours & Plants (25 tests)  [] Hairdresser panel (12 tests)  [] Rubber Chemicals (22 tests)  [] Plastics (26 tests)  [] Colorants/Dyes/Food additives (20 tests)  [] Metals (implants/dental) (24 tests)  [] Local anaesthetics/NSAIDs (13 tests)  [] Antibiotics & Antimycotics (14 tests)   [] Corticosteroids (15 tests)   [] Photopatch test (62 tests)   [] others: ...      [] Patient's own products: ...    DO NOT test if chemical or biological identity is unknown!     always ask from patient the product information and safety sheets (MSDS)       Order for PRICK TESTS    Reason for tests (suspected allergy): atopic predisposition (childhood eczema, seasonal ear itchiness in Spring)  Known previous allergies: none    Standardized prick panels  [x] Atopic panel (20 tests)  [] Pediatric Panel (12 tests)  [] Milk, Meat, Eggs, Grains (20 tests)   [] Dust, Epithelia, Feathers (10 tests)  [] Fish, Seafood, Shellfish (17 tests)  [] Nuts, Beans (14 tests)  [] Spice, Vegetable, Fruit (17 tests)  [] Pollen Panel = Tree, Grass, Weed (24 tests)  [] Others: ...      [] Patient's own products: ...    DO NOT test if chemical or biological identity is unknown!     always ask from patient the product information and safety sheets (MSDS)     Standardized intradermal tests  [x] Penicillium  notatum [x] Aspergillus fumigatus [x] House dust mites D.far & D. pteron  [] Cat    [] dog  [x] Others: Alternaria  [] Bee venom   [] Wasp venom  !!Specific protocol with dilutions!!       Atopy Screen (Placed Apr 28, 2023)  No Substance Readings (15 min) Evaluation   POS Histamine 1mg/ml ++    NEG NaCl 0.9% -      No Substance Readings (15 min) Evaluation   1 Alternaria alternata (tenuis)  -    2 Cladosporium herbarum -    3 Aspergillus fumigatus -    4 Penicillium notatum -    5 Dermatophagoides pteronyssinus -    6 Dermatophagoides farinae -    7 Dog epithelium (canis spp) -    8 Cat hair (nelly catus) -    9 Cockroach   (Blatella americana & germanica) -    10 Grass mix midwest   ([place], Orchard, [place], Rodrigo) -    11 Esteban grass (sorghum halepense) -    12 Weed mix   (common Cocklebur, Lamb s quarters, rough redroot Pigweed, Dock/Sorrel) -    13 Mug wort (artemisia vulgare) -    14 Ragweed giant/short (ambrosia spp) -    15 White birch (Betula papyrifera) -    16 Tree mix 1 (Pecan, Maple BHR, Oak RVW, american Crane, black [place]) -    17 Red cedar (juniperus virginia) (+)    18 Tree mix 2   (white Fili, river/red Birch, black [place], common [place], american Elm) -    19 Box elder/Maple mix (acer spp) -    20 Cubero shagbark (carya ovata) -           Conclusion      Intradermal Testing (Placed Apr 28, 2023)  No Substance Conc.  Reading (15min)  immediate Papule [mm] / Erythema [mm] Reading   (... days)  delayed Papule [mm] / Erythema [mm] Remarks   DF Standard Dust Mite - D. Farinae 1:10 -   -    DP Standard Dust Mite - D. Pteronyssinus 1:10 -   -    A Aspergillus fumigatus  1:10 +/++ 7/12  -    P Penicillium notatum 1:10 -   -     Alternaria alt 1:10 -   -      1:10 -   -    Conclusions: immediate type reaction to mold Aspergillus, not to dust mites. No delayed reactions observed.    ________________________________  RESULTS & EVALUATION of PATCH TESTS    Jun 26, 2023 application of patch  tests:    Patch test readings after     [x] 2 days, [] 3 days [x] 4 days, [] 5 days,  Other duration: ...      STANDARD Series                                          # Substance 2 days 4 days remarks     1 Mario Mix [C] - -       2 Colophony +/++ +/++       3  2-Mercaptobenzothiazole  - -       4 Methylisothiazolinone - -       5 Carba Mix - -       6 Thiuram Mix [A] - -       7 Bisphenol A Epoxy Resin - -       8 W-Slke-Eyycxplfxci-Formaldehyde Resin - -       9 Mercapto Mix [A] - -       10 Black Rubber Mix- PPD [B] - -       11 Potassium Dichromate  -  -       12 Balsam of [place] (Myroxylon Pereirae Resin) - -       13 Nickel Sulphate Hexahydrate - -       14 Mixed Dialkyl Thiourea - -       15 Paraben Mix [B] - -       16 Methyldibromo Glutaronitrile - -       17 Fragrance Mix 8% - -       18 2-Bromo-2-Nitropropane-1,3-Diol (Bronopol) CT - -       19 Lyral - -       20 Tixocortol-21- Pivalate CT - -       21 Diazolidinyl urea (Germall II) - -        22 Methyl Methacrylate - -       23 Cobalt (II) Chloride Hexahydrate - -       24 Fragrance Mix II  - -       25 Compositae Mix - -       26 Benzoyl Peroxide - -       27 Bacitracin - -       28 Formaldehyde - -       29 Methylchloroisothiazolinone / Methylisothiazolinone - -       30 Corticosteroid Mix CT - -       31 Sodium Lauryl Sulfate - -       32 Lanolin Alcohol - -       33 Turpentine - -       34 Cetylstearylalcohol - -       35 Chlorhexidine Dicluconate - -       36 Budesonide - -       37 Imidazolidinyl Urea  - -       38 Ethyl-2 Cyanoacrylate - -       39 Quaternium 15 (Dowicil 200) - -       40 Decyl Glucoside - -       PRESERVATIVES & ANTIMICROBIALS        # Substance 2 days 4 days remarks   41 1  1,2-Benzisothiazoline-3-One, Sodium Salt - -     2  1,3,5-Roel (2-Hydroxyethyl) - Hexahydrotriazine (Grotan BK) - -     3 1-Qaqcozcyzqcxw-0-Nitro-1, 3-Propanediol - -     4  3, 4, 4' - Triclocarban - -    45 5 4 - Chloro - 3 - Cresol - -     6 4 - Chloro - 4 -  Xylenol (PCMX) - -     7 7-Ethylbicyclooxazolidine (Bioban [id number]) - -     8 Benzalkonium Chloride CT - -     9 Benzyl Alcohol - -    50 10 Cetalkonium Chloride - -     11 Cetylpyrimidine Chloride  - -     12 Chloroacetamide - -     13 DMDM Hydantoin - -     14 Glutaraldehyde - -    55 15 Triclosan - -     16 Glyoxal Trimeric Dihydrate - -     17 Iodopropynyl Butylcarbamate - -     18 Octylisothiazoline - -     19 Bithionol CT - -    60 20 Bioban P 1487 (Nitrobutyl) Morpholine/(Ethylnitro-Trimethylene) Dimorpholine - -     21 Phenoxyethanol - -     22 Phenyl Salicylate - -     23 Povidone Iodine color -     24 Sodium Benzoate - -    65 25 Sodium Disulfite (+) -     26 Sorbic Acid - -     27 Thimerosal - -     28 Melamine Formaldehyde Resin - -     29 Ethylenediamine Dihydrochloride - -      Parabens      70 30 Butyl-P-Hydroxybenzoate - -     31 Ethyl-P-Hydroxybenzoate - -     32 Methyl-P-Hydroxybenzoate - -    73 33 Propyl-P-Hydroxybenzoate - -      EMULSIFIERS & ADDITIVES       # Substance 2 days 4 days remarks   74 1 Polyethylene Glycol-400 - -    75 2 Cocamidopropyl Betaine - -     3 Amerchol L101 - -     4 Propylene Glycol - -     5 Triethanolamine - -     6 Sorbitane Sesquiolate CT - -    80 7 Isopropylmyristate - -     8 Polysorbate 80 CT - -     9 Amidoamine   (Stearamidopropyl Dimethylamine) - -     10 Oleamidopropyl Dimethylamine - -     11 Lauryl Glucoside - -    85 12 Coconut Diethanolamide  - -     13 2-Hydroxy-4-Methoxy Benzophenone (Oxybenzone) - -     14 Benzophenone-4 (Sulisobenzon) - -     15 Propolis +/++ +     16 Dexpanthenol - -    90 17 Abitol + +     18 Tert-Butylhydroquinone - -     19 Benzyl Salicylate - -     20 Dimethylaminopropylamin (DMPA) - -     21 Zinc Pyrithione (Zinc Omadine)  - -    95 22 Roel(Hydroxymethyl) Nitromethane  - -      Antioxidant       23 Dodecyl Gallate - -     24 Butylhydroxyanisole (BHA) - -     25 Butylhydroxytoluene (BHT) - -     26 Di-Alpha-Tocopherol (Vit E) - -     100 27 Propyl Gallate - -      PERFUMES, FLAVORS & PLANTS        # Substance 2 days 4 days remarks   101 1 Benzyl Cinnamate - -     2 Di-Limonene (Dipentene) - -     3 Cananga Odorata (Kan Omer) (I) - -     4 Lichen Acid Mix - -    105 5 Mentha Piperita Oil (Peppermint Oil) + +/++     6 Sesquiterpenelactone mix - -     7 Tea Tree Oil, Oxidized - -     8 Wood Tar Mix (+) (+)     9 Abietic Acid - -    110 10 Lavendula Angustifolia Oil (Lavender Oil) - -     11 Fragrance mix II CT * 14% - -      Fragrance Mix I       12 [place] Absolute - -     13 Eugenol - -     14 Geraniol - -    115 15 Hydroxycitronellal - -     16 Isoeugenol - -     17 Cinnamic Aldehyde - -     18 Cinnamic Alcohol  - -      Fragrance mix II       19 Citronellol - -    120 20 Alpha-Hexylcinnamic Aldehyde    - -     21 Citral - -     22 Farnesol - -    123 23 Coumarin - -    Hexylcinnamic aldehyde, Coumarin, Farnesol, Hydroxyisohexy3-cyclohexene carboxaldehyde, citral, citrolellol    Results of patch tests:                         Interpretation:  - Negative                    A    = Allergic      (+) Erythema    TI   = Toxic/irritant   + E + Infiltration    RaP = Relevance at Present     ++ E/I + Papulovesicle   Rpr  = Relevance Previously     +++ E/I/P + Blister     nR   = No Relevance    [] No relevant allergic reaction observed    [x] Allergic reaction diagnosed against following allergens:       Fragrances/adhesives/disinfectants: +/++ Colophony, + Abitol, + Propolis    Fragrance/flavor: +/++ peppermint oil      Interpretation/ remarks:   See later    [x] Patient information given   [x] [facility]CDS [facility] information's (# [id number], and [id number]) to following compounds:  Colophony, Abitol, Propolis,  peppermint oil     [] General information's to following compounds: ......      Assessment & Plan:    ==> Final Diagnosis:     # Recurrent eczematous facial eruption  * chronic complicated illness  > partially atopic dermatitis with atopy  > partially  allergic contact dermatitis to various resins/adhesives and fragrance = peppermint oil  DDx perioral dermatitis/rosacea       # Atopic predisposition with    childhood eczema    seasonal ear itchiness in Spring = mold allergy (Aspergillus)  * chronic stable illness    These conclusions are made at the best of one's knowledge and belief based on the provided evidence such as patient's history and allergy test results and they can change over time or can be incomplete because of missing information's.    ==> Treatment Plan:    >> follow the recommendations of the [facility] and use only products from the [facility] (incl tooth paste)    > continue with topical treatment tacrolimus ointment BID PRN for facial eruption (prescribed by Dr. Guan) and for acute flare ups use Desonide cream (but not more than 2xweekly)  ___________________________    Staff: : provider    Follow-up in Derm-Allergy clinic if necessary  ___________________________    I spent a total of 30 minutes with Jessica Styles during today s  visit. This time was spent discussing all the individual test results, correlating them to the clinical relevance, counseling the patient and/or coordinating care

## 2023-06-30 NOTE — LETTER
6/30/2023         RE: Jessica Styles  [address]  [place] [address]        Dear Colleague,    Thank you for referring your patient, Jessica Styles, to the [facility] [place]. Please see a copy of my visit note below.    [facility] Dermato-[facility]ergology Note  Office visit  Encounter Date: Jun 30, 2023  ____________________________________________    CC: No chief complaint on file.      HPI:  (Jun 30, 2023)  Ms. Jessica Styles is a(n) 22 year old female who presents today as a return patient for allergy consultation  - Follow-up in Derm-Allergy clinic for 2nd readings and final conclusions after 4 days   - otherwise feeling well in usual state of health    Physical exam:  General: In no acute distress, well-developed, well-nourished  Eyes: no conjunctivitis  ENT: no signs of rhinitis   Pulmonary: no wheezing or coughing  Skin:Focused examination of the skin on test sites was performed = see test results below    Earlier History and Allergy exams:  (Jun 28, 2023)  - Follow-up in Derm-Allergy clinic for 1st readings of patch tests after 2 days     Earlier History and Allergy exams:  (Jun 26, 2023)  - Follow-up in Derm-Allergy clinic for patch tests in July. If any delayed reaction to the IDT we will see patient earlier and then discuss if patch tests necessary    Earlier History and Allergy exams:  (Apr 28, 2023)  - Oct 2022, had perioral dermatitis, treated with doxy and this helped a lot and also elidel   - around Dec 2022, started to get eczema around eyes; went away within a few months with hydrocortisone   - then a few months ago, had eczema all over face. Treated this tacrolimus cream starting a few weeks ago and this helped but still active  - wondering if it could be any of her products causing the recurrent facial eczema    - denies any new products  - current facial products: Sunscreen and moisturizer; makeup occasionally   - current shampoo: Elder brand   - denies  rash anywhere else on the body     - ill-defined scaly faint patches on bilateral upper eyelids and lateral cheeks       Past Medical History:   Patient Active Problem List   Diagnosis     Acne vulgaris     Recurrent herpes labialis     No past medical history on file.    Allergies:  No Known Allergies    Medications:  Current Outpatient Medications   Medication     tacrolimus (PROTOPIC) 0.1 % external ointment     valACYclovir (VALTREX) 1000 mg tablet     valACYclovir (VALTREX) 1000 mg tablet     valACYclovir (VALTREX) 1000 mg tablet     No current facility-administered medications for this visit.       Social History:  The patient is a student. Denies new hobbies.     Family History:  Family History   Problem Relation Age of Onset     No Known Problems Mother      No Known Problems Father      Breast Cancer Maternal Grandmother      No Known Problems Maternal Grandfather      No Known Problems Paternal Grandmother      No Known Problems Paternal Grandfather      No Known Problems Brother      No Known Problems Sister      No Known Problems Son      No Known Problems Daughter      No Known Problems Maternal Half-Brother      No Known Problems Maternal Half-Sister      No Known Problems Paternal Half-Brother      No Known Problems Paternal Half-Sister      No Known Problems Niece      No Known Problems Nephew      No Known Problems Cousin      No Known Problems Other      Cancer No family hx of      Diabetes No family hx of      Coronary Artery Disease No family hx of      Heart Disease No family hx of      Hypertension No family hx of      Hyperlipidemia No family hx of      Kidney Disease No family hx of      Cerebrovascular Disease No family hx of      Obesity No family hx of      Thrombosis No family hx of      Asthma No family hx of      Arthritis No family hx of      Thyroid Disease No family hx of      Depression No family hx of      Mental Illness No family hx of      Substance Abuse No family hx of       Cystic Fibrosis No family hx of      Early Death No family hx of      Coronary Artery Disease Early Onset No family hx of      Heart Failure No family hx of      Bleeding Diathesis No family hx of      Dementia No family hx of      Ovarian Cancer No family hx of      Uterine Cancer No family hx of      Prostate Cancer No family hx of      Colorectal Cancer No family hx of      Pancreatic Cancer No family hx of      Lung Cancer No family hx of      Melanoma No family hx of      Autoimmune Disease No family hx of      Unknown/Adopted No family hx of      Genetic Disorder No family hx of        Previous Labs, Allergy Tests, Dermatopathology, Imaging:  none    Referred By: No referring provider defined for this encounter.     Allergy Tests:    Past Allergy Test    Order for Future Allergy Testing:    [x] Outpatient  [] Inpatient: [facility]..../ Bed ....       Skin Atopy (atopic dermatitis) [x] Yes   [] No .........hx of childhood eczema and more recent facial eczema  Contact allergies:   [] Yes   [] No ..........?? told in past allergy to toothpaste and fruit based on rash morphology but didn't under go testing   Hand eczema:   [] Yes   [x] No           Leading hand:   [] R   [] L       [] Ambidextrous         Drug allergies:        [] Yes   [x] No  which?......     Urticaria/Angioedema  [] Yes   [x] No .........  Food Allergy:  [] Yes   [] No  Which?......?? told in past fruit based on rash morphology but didn't under go testing   Pets :  [] Yes   [x] No  which?......         []  Rhinitis   [] Conjunctivitis   [] Sinusitis   [] Polyposis   [x] Otitis (ear itchiness and cough; usually in the Spring; ?seasonal allergies)  [] Pharyngitis         []  Postnasal drip    []  none  Operations:   [] Tonsils   [] Septum   [] Sinus   [] Polyposis        [] Asthma bronchiale   [] Coughing      [x]  none  Symptoms (mostly Rhinoconjunctivitis and Asthma) aggravated by:  Season   [] I   [] II   [] III   [] IV   []V   []VI   []VII   []VIII    []IX   []X   []XI   []XII     [] perennial   Day time      [] morning   [] noon      [] evening        [] night    [] whole day........  []  none  Location/changes    [] inside        [] outside   [] mountains    [] sea     [] others.............   []  none  Triggers, specific     [] animals     [] plants     [] dust              [] others ...........................    []  none  Triggers, others       [] work          [] psyche    [] sport            [] others .............................  []  none  Irritant                [] phys efforts [] smoke    [] heat/cold     [] odors  []others............... []  none    Order for PATCH TESTS  Reason for tests (suspected allergy):  Facial dermatitis  Known previous allergies:   Standardized panels  [x] Standard panel (40 tests)  [x] Preservatives & Antimicrobials (31 tests)  [x] Emulsifiers & Additives (25 tests)   [x] Perfumes/Flavours & Plants (25 tests)  [] Hairdresser panel (12 tests)  [] Rubber Chemicals (22 tests)  [] Plastics (26 tests)  [] Colorants/Dyes/Food additives (20 tests)  [] Metals (implants/dental) (24 tests)  [] Local anaesthetics/NSAIDs (13 tests)  [] Antibiotics & Antimycotics (14 tests)   [] Corticosteroids (15 tests)   [] Photopatch test (62 tests)   [] others: ...      [] Patient's own products: ...    DO NOT test if chemical or biological identity is unknown!     always ask from patient the product information and safety sheets (MSDS)       Order for PRICK TESTS    Reason for tests (suspected allergy): atopic predisposition (childhood eczema, seasonal ear itchiness in Spring)  Known previous allergies: none    Standardized prick panels  [x] Atopic panel (20 tests)  [] Pediatric Panel (12 tests)  [] Milk, Meat, Eggs, Grains (20 tests)   [] Dust, Epithelia, Feathers (10 tests)  [] Fish, Seafood, Shellfish (17 tests)  [] Nuts, Beans (14 tests)  [] Spice, Vegetable, Fruit (17 tests)  [] Pollen Panel = Tree, Grass, Weed (24 tests)  [] Others:  ...      [] Patient's own products: ...    DO NOT test if chemical or biological identity is unknown!     always ask from patient the product information and safety sheets (MSDS)     Standardized intradermal tests  [x] Penicillium notatum [x] Aspergillus fumigatus [x] House dust mites D.far & D. pteron  [] Cat    [] dog  [x] Others: Alternaria  [] Bee venom   [] Wasp venom  !!Specific protocol with dilutions!!       Atopy Screen (Placed Apr 28, 2023)  No Substance Readings (15 min) Evaluation   POS Histamine 1mg/ml ++    NEG NaCl 0.9% -      No Substance Readings (15 min) Evaluation   1 Alternaria alternata (tenuis)  -    2 Cladosporium herbarum -    3 Aspergillus fumigatus -    4 Penicillium notatum -    5 Dermatophagoides pteronyssinus -    6 Dermatophagoides farinae -    7 Dog epithelium (canis spp) -    8 Cat hair (nelly catus) -    9 Cockroach   (Blatella americana & germanica) -    10 Grass mix midwest   (Ansley, Orchard, Redtop, Rodrigo) -    11 Esteban grass (sorghum halepense) -    12 Weed mix   (common Cocklebur, Lamb s quarters, rough redroot Pigweed, Dock/Sorrel) -    13 Mug wort (artemisia vulgare) -    14 Ragweed giant/short (ambrosia spp) -    15 White birch (Betula papyrifera) -    16 Tree mix 1 (Pecan, Maple BHR, Oak RVW, american Roslyn, black Thornton) -    17 Red cedar (juniperus virginia) (+)    18 Tree mix 2   (white Fili, river/red Birch, black Larwill, common Dorado, american Elm) -    19 Box elder/Maple mix (acer spp) -    20 [place] shagbark (carya ovata) -           Conclusion      Intradermal Testing (Placed Apr 28, 2023)  No Substance Conc.  Reading (15min)  immediate Papule [mm] / Erythema [mm] Reading   (... days)  delayed Papule [mm] / Erythema [mm] Remarks   DF Standard Dust Mite - D. Farinae 1:10 -   -    DP Standard Dust Mite - D. Pteronyssinus 1:10 -   -    A Aspergillus fumigatus  1:10 +/++ 7/12  -    P Penicillium notatum 1:10 -   -     Alternaria alt 1:10 -   -      1:10 -   -     Conclusions: immediate type reaction to mold Aspergillus, not to dust mites. No delayed reactions observed.    ________________________________  RESULTS & EVALUATION of PATCH TESTS    Jun 26, 2023 application of patch tests:    Patch test readings after     [x] 2 days, [] 3 days [x] 4 days, [] 5 days,  Other duration: ...      STANDARD Series                                          # Substance 2 days 4 days remarks     1 Mario Mix [C] - -       2 Colophony +/++ +/++       3  2-Mercaptobenzothiazole  - -       4 Methylisothiazolinone - -       5 Carba Mix - -       6 Thiuram Mix [A] - -       7 Bisphenol A Epoxy Resin - -       8 K-Ymsx-Qfibhwjslyk-Formaldehyde Resin - -       9 Mercapto Mix [A] - -       10 Black Rubber Mix- PPD [B] - -       11 Potassium Dichromate  -  -       12 Balsam of [place] (Myroxylon Pereirae Resin) - -       13 Nickel Sulphate Hexahydrate - -       14 Mixed Dialkyl Thiourea - -       15 Paraben Mix [B] - -       16 Methyldibromo Glutaronitrile - -       17 Fragrance Mix 8% - -       18 2-Bromo-2-Nitropropane-1,3-Diol (Bronopol) CT - -       19 Lyral - -       20 Tixocortol-21- Pivalate CT - -       21 Diazolidinyl urea (Germall II) - -        22 Methyl Methacrylate - -       23 Cobalt (II) Chloride Hexahydrate - -       24 Fragrance Mix II  - -       25 Compositae Mix - -       26 Benzoyl Peroxide - -       27 Bacitracin - -       28 Formaldehyde - -       29 Methylchloroisothiazolinone / Methylisothiazolinone - -       30 Corticosteroid Mix CT - -       31 Sodium Lauryl Sulfate - -       32 Lanolin Alcohol - -       33 Turpentine - -       34 Cetylstearylalcohol - -       35 Chlorhexidine Dicluconate - -       36 Budesonide - -       37 Imidazolidinyl Urea  - -       38 Ethyl-2 Cyanoacrylate - -       39 Quaternium 15 (Dowicil 200) - -       40 Decyl Glucoside - -       PRESERVATIVES & ANTIMICROBIALS        # Substance 2 days 4 days remarks   41 1  1,2-Benzisothiazoline-3-One, Sodium Salt  - -     2  1,3,5-Roel (2-Hydroxyethyl) - Hexahydrotriazine (Grotan BK) - -     3 5-Xvceyjeriypyy-2-Nitro-1, 3-Propanediol - -     4  3, 4, 4' - Triclocarban - -    45 5 4 - Chloro - 3 - Cresol - -     6 4 - Chloro - 4 - Xylenol (PCMX) - -     7 7-Ethylbicyclooxazolidine (Bioban [id number]) - -     8 Benzalkonium Chloride CT - -     9 Benzyl Alcohol - -    50 10 Cetalkonium Chloride - -     11 Cetylpyrimidine Chloride  - -     12 Chloroacetamide - -     13 DMDM Hydantoin - -     14 Glutaraldehyde - -    55 15 Triclosan - -     16 Glyoxal Trimeric Dihydrate - -     17 Iodopropynyl Butylcarbamate - -     18 Octylisothiazoline - -     19 Bithionol CT - -    60 20 Bioban P 1487 (Nitrobutyl) Morpholine/(Ethylnitro-Trimethylene) Dimorpholine - -     21 Phenoxyethanol - -     22 Phenyl Salicylate - -     23 Povidone Iodine color -     24 Sodium Benzoate - -    65 25 Sodium Disulfite (+) -     26 Sorbic Acid - -     27 Thimerosal - -     28 Melamine Formaldehyde Resin - -     29 Ethylenediamine Dihydrochloride - -      Parabens      70 30 Butyl-P-Hydroxybenzoate - -     31 Ethyl-P-Hydroxybenzoate - -     32 Methyl-P-Hydroxybenzoate - -    73 33 Propyl-P-Hydroxybenzoate - -      EMULSIFIERS & ADDITIVES       # Substance 2 days 4 days remarks   74 1 Polyethylene Glycol-400 - -    75 2 Cocamidopropyl Betaine - -     3 Amerchol L101 - -     4 Propylene Glycol - -     5 Triethanolamine - -     6 Sorbitane Sesquiolate CT - -    80 7 Isopropylmyristate - -     8 Polysorbate 80 CT - -     9 Amidoamine   (Stearamidopropyl Dimethylamine) - -     10 Oleamidopropyl Dimethylamine - -     11 Lauryl Glucoside - -    85 12 Coconut Diethanolamide  - -     13 2-Hydroxy-4-Methoxy Benzophenone (Oxybenzone) - -     14 Benzophenone-4 (Sulisobenzon) - -     15 Propolis +/++ +     16 Dexpanthenol - -    90 17 Abitol + +     18 Tert-Butylhydroquinone - -     19 Benzyl Salicylate - -     20 Dimethylaminopropylamin (DMPA) - -     21 Zinc Pyrithione (Zinc  Omadine)  - -    95 22 Roel(Hydroxymethyl) Nitromethane  - -      Antioxidant       23 Dodecyl Gallate - -     24 Butylhydroxyanisole (BHA) - -     25 Butylhydroxytoluene (BHT) - -     26 Di-Alpha-Tocopherol (Vit E) - -    100 27 Propyl Gallate - -      PERFUMES, FLAVORS & PLANTS        # Substance 2 days 4 days remarks   101 1 Benzyl Cinnamate - -     2 Di-Limonene (Dipentene) - -     3 Cananga Odorata (Kan Omer) (I) - -     4 Lichen Acid Mix - -    105 5 Mentha Piperita Oil (Peppermint Oil) + +/++     6 Sesquiterpenelactone mix - -     7 Tea Tree Oil, Oxidized - -     8 Wood Tar Mix (+) (+)     9 Abietic Acid - -    110 10 Lavendula Angustifolia Oil (Lavender Oil) - -     11 Fragrance mix II CT * 14% - -      Fragrance Mix I       12 Oakmoss Absolute - -     13 Eugenol - -     14 Geraniol - -    115 15 Hydroxycitronellal - -     16 Isoeugenol - -     17 Cinnamic Aldehyde - -     18 Cinnamic Alcohol  - -      Fragrance mix II       19 Citronellol - -    120 20 Alpha-Hexylcinnamic Aldehyde    - -     21 Citral - -     22 Farnesol - -    123 23 Coumarin - -    Hexylcinnamic aldehyde, Coumarin, Farnesol, Hydroxyisohexy3-cyclohexene carboxaldehyde, citral, citrolellol    Results of patch tests:                         Interpretation:  - Negative                    A    = Allergic      (+) Erythema    TI   = Toxic/irritant   + E + Infiltration    RaP = Relevance at Present     ++ E/I + Papulovesicle   Rpr  = Relevance Previously     +++ E/I/P + Blister     nR   = No Relevance    [] No relevant allergic reaction observed    [x] Allergic reaction diagnosed against following allergens:       Fragrances/adhesives/disinfectants: +/++ Colophony, + Abitol, + Propolis    Fragrance/flavor: +/++ peppermint oil      Interpretation/ remarks:   See later    [x] Patient information given   [x] ACDS CAMP information's (# [id number], and [id number]) to following compounds:  Colophony, Abitol, Propolis,  peppermint oil     [] General  information's to following compounds: ......      Assessment & Plan:    ==> Final Diagnosis:     # Recurrent eczematous facial eruption  * chronic complicated illness  > partially atopic dermatitis with atopy  > partially allergic contact dermatitis to various resins/adhesives and fragrance = peppermint oil  DDx perioral dermatitis/rosacea       # Atopic predisposition with    childhood eczema    seasonal ear itchiness in Spring = mold allergy (Aspergillus)  * chronic stable illness    These conclusions are made at the best of one's knowledge and belief based on the provided evidence such as patient's history and allergy test results and they can change over time or can be incomplete because of missing information's.    ==> Treatment Plan:    >> follow the recommendations of the [facility] and use only products from the [facility] (incl tooth paste)    > continue with topical treatment tacrolimus ointment BID PRN for facial eruption (prescribed by Dr. Guan) and for acute flare ups use Desonide cream (but not more than 2xweekly)  ___________________________    Staff: : provider    Follow-up in Derm-Allergy clinic if necessary  ___________________________    I spent a total of 30 minutes with Jessica Styles during today s  visit. This time was spent discussing all the individual test results, correlating them to the clinical relevance, counseling the patient and/or coordinating care          Again, thank you for allowing me to participate in the care of your patient.        Sincerely,        Dwight Juan MD

## 2023-07-26 ASSESSMENT — ENCOUNTER SYMPTOMS
EYE PAIN: 0
ABDOMINAL PAIN: 0
DIZZINESS: 0
HEMATURIA: 0
PALPITATIONS: 0
SORE THROAT: 0
PARESTHESIAS: 0
JOINT SWELLING: 0
NAUSEA: 0
SHORTNESS OF BREATH: 0
HEARTBURN: 0
HEADACHES: 0
ARTHRALGIAS: 0
FEVER: 0
CONSTIPATION: 0
DYSURIA: 0
HEMATOCHEZIA: 0
DIARRHEA: 0
CHILLS: 0
WEAKNESS: 0
FREQUENCY: 0
NERVOUS/ANXIOUS: 0
MYALGIAS: 0
COUGH: 0
BREAST MASS: 0

## 2023-07-30 ENCOUNTER — HEALTH MAINTENANCE LETTER (OUTPATIENT)
Age: 23
End: 2023-07-30

## 2023-08-01 ENCOUNTER — OFFICE VISIT (OUTPATIENT)
Dept: FAMILY MEDICINE | Facility: CLINIC | Age: 23
End: 2023-08-01
Payer: COMMERCIAL

## 2023-08-01 VITALS
OXYGEN SATURATION: 97 % | HEIGHT: 64 IN | HEART RATE: 88 BPM | DIASTOLIC BLOOD PRESSURE: 68 MMHG | SYSTOLIC BLOOD PRESSURE: 118 MMHG | WEIGHT: 115.5 LBS | RESPIRATION RATE: 16 BRPM | BODY MASS INDEX: 19.72 KG/M2 | TEMPERATURE: 98.9 F

## 2023-08-01 DIAGNOSIS — Z13.220 LIPID SCREENING: ICD-10-CM

## 2023-08-01 DIAGNOSIS — Z00.00 ROUTINE GENERAL MEDICAL EXAMINATION AT A HEALTH CARE FACILITY: Primary | ICD-10-CM

## 2023-08-01 DIAGNOSIS — Z11.59 NEED FOR HEPATITIS C SCREENING TEST: ICD-10-CM

## 2023-08-01 DIAGNOSIS — Z13.1 SCREENING FOR DIABETES MELLITUS: ICD-10-CM

## 2023-08-01 DIAGNOSIS — Z12.4 CERVICAL CANCER SCREENING: ICD-10-CM

## 2023-08-01 PROBLEM — L71.0 POD (PERIORAL DERMATITIS): Status: ACTIVE | Noted: 2022-10-05

## 2023-08-01 PROBLEM — H01.139 ECZEMA OF EYELID: Status: ACTIVE | Noted: 2022-12-14

## 2023-08-01 LAB
CHOLEST SERPL-MCNC: 169 MG/DL
HBA1C MFR BLD: 5.1 % (ref 0–5.6)
HDLC SERPL-MCNC: 46 MG/DL
LDLC SERPL CALC-MCNC: 88 MG/DL
NONHDLC SERPL-MCNC: 123 MG/DL
TRIGL SERPL-MCNC: 174 MG/DL

## 2023-08-01 PROCEDURE — 83036 HEMOGLOBIN GLYCOSYLATED A1C: CPT | Performed by: FAMILY MEDICINE

## 2023-08-01 PROCEDURE — 36415 COLL VENOUS BLD VENIPUNCTURE: CPT | Performed by: FAMILY MEDICINE

## 2023-08-01 PROCEDURE — 90471 IMMUNIZATION ADMIN: CPT | Performed by: FAMILY MEDICINE

## 2023-08-01 PROCEDURE — 90715 TDAP VACCINE 7 YRS/> IM: CPT | Performed by: FAMILY MEDICINE

## 2023-08-01 PROCEDURE — 99395 PREV VISIT EST AGE 18-39: CPT | Mod: 25 | Performed by: FAMILY MEDICINE

## 2023-08-01 PROCEDURE — 80061 LIPID PANEL: CPT | Performed by: FAMILY MEDICINE

## 2023-08-01 PROCEDURE — 86803 HEPATITIS C AB TEST: CPT | Performed by: FAMILY MEDICINE

## 2023-08-01 ASSESSMENT — ENCOUNTER SYMPTOMS
MYALGIAS: 0
NERVOUS/ANXIOUS: 0
CHILLS: 0
ARTHRALGIAS: 0
FEVER: 0
NAUSEA: 0
DIZZINESS: 0
SORE THROAT: 0
EYE PAIN: 0
FREQUENCY: 0
PALPITATIONS: 0
SHORTNESS OF BREATH: 0
DYSURIA: 0
JOINT SWELLING: 0
PARESTHESIAS: 0
CONSTIPATION: 0
ABDOMINAL PAIN: 0
COUGH: 0
BREAST MASS: 0
HEMATOCHEZIA: 0
WEAKNESS: 0
HEARTBURN: 0
HEMATURIA: 0
HEADACHES: 0
DIARRHEA: 0

## 2023-08-01 ASSESSMENT — PAIN SCALES - GENERAL: PAINLEVEL: NO PAIN (0)

## 2023-08-01 NOTE — PROGRESS NOTES
SUBJECTIVE:   CC: Jessica is an 22 year old who presents for preventive health visit.       8/1/2023     1:35 PM   Additional Questions   Roomed by VE   Accompanied by SELF       Healthy Habits:     Getting at least 3 servings of Calcium per day:  Yes    Bi-annual eye exam:  Yes    Dental care twice a year:  NO    Sleep apnea or symptoms of sleep apnea:  None    Diet:  Regular (no restrictions)    Frequency of exercise:  1 day/week    Duration of exercise:  30-45 minutes    Taking medications regularly:  Yes    Medication side effects:  Not applicable    Additional concerns today:  No      New to provider without concerns, [employer].    Social History     Tobacco Use    Smoking status: Never     Passive exposure: Never    Smokeless tobacco: Never   Substance Use Topics    Alcohol use: Never             7/26/2023    12:49 PM   Alcohol Use   Prescreen: >3 drinks/day or >7 drinks/week? No          No data to display              Reviewed orders with patient.  Reviewed health maintenance and updated orders accordingly - Yes  BP Readings from Last 3 Encounters:   08/01/23 118/68   06/23/22 122/82   03/15/21 102/62    Wt Readings from Last 3 Encounters:   08/01/23 52.4 kg (115 lb 8 oz)   06/23/22 53.2 kg (117 lb 3.2 oz)   03/15/21 53.7 kg (118 lb 4.8 oz) (32 %, Z= -0.48)*     * Growth percentiles are based on CDC (Girls, 2-20 Years) data.                  Patient Active Problem List   Diagnosis    Acne vulgaris    Recurrent herpes labialis    POD (perioral dermatitis)    Eczema of eyelid     Past Surgical History:   Procedure Laterality Date    NO HISTORY OF SURGERY         Social History     Tobacco Use    Smoking status: Never     Passive exposure: Never    Smokeless tobacco: Never   Substance Use Topics    Alcohol use: Never     Family History   Problem Relation Age of Onset    Diabetes Mother     Anesthesia Reaction Mother     Asthma Mother     No Known Problems Father     Breast Cancer  Maternal Grandmother     Diabetes Maternal Grandfather     No Known Problems Paternal Grandmother     No Known Problems Paternal Grandfather     No Known Problems Brother     No Known Problems Sister     No Known Problems Son     No Known Problems Daughter     No Known Problems Maternal Half-Brother     No Known Problems Maternal Half-Sister     No Known Problems Paternal Half-Brother     No Known Problems Paternal Half-Sister     No Known Problems Niece     No Known Problems Nephew     No Known Problems Cousin     Hypertension Other     Cancer No family hx of     Coronary Artery Disease No family hx of     Heart Disease No family hx of     Hyperlipidemia No family hx of     Kidney Disease No family hx of     Cerebrovascular Disease No family hx of     Obesity No family hx of     Thrombosis No family hx of     Arthritis No family hx of     Thyroid Disease No family hx of     Depression No family hx of     Mental Illness No family hx of     Substance Abuse No family hx of     Cystic Fibrosis No family hx of     Early Death No family hx of     Coronary Artery Disease Early Onset No family hx of     Heart Failure No family hx of     Bleeding Diathesis No family hx of     Dementia No family hx of     Ovarian Cancer No family hx of     Uterine Cancer No family hx of     Prostate Cancer No family hx of     Colorectal Cancer No family hx of     Pancreatic Cancer No family hx of     Lung Cancer No family hx of     Melanoma No family hx of     Autoimmune Disease No family hx of     Unknown/Adopted No family hx of     Genetic Disorder No family hx of          Current Outpatient Medications   Medication Sig Dispense Refill    desonide (DESOWEN) 0.05 % external cream Apply topically daily as needed If acute flare ups, then use it for 2 days daily and then change to regular treatment with Protopic 15 g 2    tacrolimus (PROTOPIC) 0.1 % external ointment Apply topically 2 times daily 60 g 11    valACYclovir (VALTREX) 1000 mg  "tablet Take 1 tablet (1,000 mg) by mouth 2 times daily for 7 days 14 tablet 0    valACYclovir (VALTREX) 1000 mg tablet Take 2 tablets (2,000 mg) by mouth 2 times daily for 1 day 4 tablet 1     No Known Allergies  Recent Labs   Lab Test 08/01/23  1438   A1C 5.1        Breast Cancer Screening:        History of abnormal Pap smear: NO - age 21-29 PAP every 3 years recommended     Reviewed and updated as needed this visit by clinical staff   Tobacco  Allergies  Meds  Problems  Med Hx  Surg Hx  Fam Hx  Soc   Hx        Reviewed and updated as needed this visit by Provider   Tobacco    Problems  Med Hx  Surg Hx  Fam Hx  Soc Hx         Review of Systems   Constitutional:  Negative for chills and fever.   HENT:  Negative for congestion, ear pain, hearing loss and sore throat.    Eyes:  Negative for pain and visual disturbance.   Respiratory:  Negative for cough and shortness of breath.    Cardiovascular:  Negative for chest pain, palpitations and peripheral edema.   Gastrointestinal:  Negative for abdominal pain, constipation, diarrhea, heartburn, hematochezia and nausea.   Breasts:  Negative for tenderness, breast mass and discharge.   Genitourinary:  Negative for dysuria, frequency, genital sores, hematuria, pelvic pain, urgency, vaginal bleeding and vaginal discharge.   Musculoskeletal:  Negative for arthralgias, joint swelling and myalgias.   Skin:  Negative for rash.   Neurological:  Negative for dizziness, weakness, headaches and paresthesias.   Psychiatric/Behavioral:  Negative for mood changes. The patient is not nervous/anxious.           OBJECTIVE:   /68 (BP Location: Left arm, Patient Position: Chair, Cuff Size: Adult Regular)   Pulse 88   Temp 98.9  F (37.2  C) (Temporal)   Resp 16   Ht 1.613 m (5' 3.5\")   Wt 52.4 kg (115 lb 8 oz)   LMP 07/16/2023 (Approximate)   SpO2 97%   Breastfeeding No   BMI 20.14 kg/m    Physical Exam  GENERAL: healthy, alert and no distress  EYES: Eyes grossly " normal to inspection, PERRL and conjunctivae and sclerae normal  HENT: ear canals and TM's normal, nose and mouth without ulcers or lesions  NECK: no adenopathy, no asymmetry, masses, or scars and thyroid normal to palpation  RESP: lungs clear to auscultation - no rales, rhonchi or wheezes  BREAST: normal without masses, tenderness or nipple discharge and no palpable axillary masses or adenopathy  CV: regular rate and rhythm, normal S1 S2, no S3 or S4, no murmur, click or rub, no peripheral edema and peripheral pulses strong  ABDOMEN: soft, nontender, no hepatosplenomegaly, no masses and bowel sounds normal  MS: no gross musculoskeletal defects noted, no edema  SKIN: no suspicious lesions or rashes  NEURO: Normal strength and tone, mentation intact and speech normal  PSYCH: mentation appears normal, affect normal/bright      ASSESSMENT/PLAN:   (Z00.00) Routine general medical examination at a health care facility  (primary encounter diagnosis)  Comment: See Counseling      (Z11.59) Need for hepatitis C screening test  Plan: Hepatitis C Screen Reflex to HCV RNA Quant and         Genotype       (Z12.4) Cervical cancer screening  Declined, She is not sexually active at this time.    (Z13.220) Lipid screening  Com  Plan: Lipid panel reflex to direct LDL Fasting      (Z13.1) Screening for diabetes mellitus  Plan: Hemoglobin A1c          COUNSELING:  Reviewed preventive health counseling, as reflected in patient instructions       Regular exercise       Healthy diet/nutrition       Vision screening       Hearing screening       Contraception       Safe sex practices/STD prevention       Colorectal Cancer Screening       Consider Hep C screening for all patients one time for ages 18-79 years       Advance Care Planning        She reports that she has never smoked. She has never been exposed to tobacco smoke. She has never used smokeless tobacco.          Doretha Babin MD  [facility]

## 2023-08-01 NOTE — NURSING NOTE
Prior to immunization administration, verified patients identity using patient s name and date of birth. Please see Immunization Activity for additional information.     Screening Questionnaire for Adult Immunization    Are you sick today?   No   Do you have allergies to medications, food, a vaccine component or latex?   Yes   Have you ever had a serious reaction after receiving a vaccination?   No   Do you have a long-term health problem with heart, lung, kidney, or metabolic disease (e.g., diabetes), asthma, a blood disorder, no spleen, complement component deficiency, a cochlear implant, or a spinal fluid leak?  Are you on long-term aspirin therapy?   No   Do you have cancer, leukemia, HIV/AIDS, or any other immune system problem?   No   Do you have a parent, brother, or sister with an immune system problem?   No   In the past 3 months, have you taken medications that affect  your immune system, such as prednisone, other steroids, or anticancer drugs; drugs for the treatment of rheumatoid arthritis, Crohn s disease, or psoriasis; or have you had radiation treatments?   No   Have you had a seizure, or a brain or other nervous system problem?   No   During the past year, have you received a transfusion of blood or blood    products, or been given immune (gamma) globulin or antiviral drug?   No   For women: Are you pregnant or is there a chance you could become       pregnant during the next month?   No   Have you received any vaccinations in the past 4 weeks?   No     Immunization questionnaire was positive for at least one answer.  Notified dr. Babin .      Patient instructed to remain in clinic for 15 minutes afterwards, and to report any adverse reactions.     Screening performed by Lori Joshi MA on 8/1/2023 at 2:20 PM.

## 2023-08-01 NOTE — PATIENT INSTRUCTIONS
DENTAL VISIT EVERY 6 MONTHS    Preventive Health Recommendations  Female Ages 21 to 25     Yearly exam:   See your health care provider every year in order to  Review health changes.   Discuss preventive care.    Review your medicines if your doctor has prescribed any.    You should be tested each year for STDs (sexually transmitted diseases).     Talk to your provider about how often you should have cholesterol testing.    Get a Pap test every three years. If you have an abnormal result, your doctor may have you test more often.    If you are at risk for diabetes, you should have a diabetes test (fasting glucose).     Shots:   Get a flu shot each year.   Get a tetanus shot every 10 years.   Consider getting the shot (vaccine) that prevents cervical cancer (Gardasil).    Nutrition:   Eat at least 5 servings of fruits and vegetables each day.  Eat whole-grain bread, whole-wheat pasta and brown rice instead of white grains and rice.  Get adequate Calcium and Vitamin D.     Lifestyle  Exercise at least 150 minutes a week each week (30 minutes a day, 5 days a week). This will help you control your weight and prevent disease.  Limit alcohol to one drink per day.  No smoking.   Wear sunscreen to prevent skin cancer.  See your dentist every six months for an exam and cleaning.

## 2023-08-02 LAB — HCV AB SERPL QL IA: NONREACTIVE

## 2023-10-23 ENCOUNTER — MYC MEDICAL ADVICE (OUTPATIENT)
Dept: FAMILY MEDICINE | Facility: CLINIC | Age: 23
End: 2023-10-23
Payer: COMMERCIAL

## 2023-10-23 DIAGNOSIS — B00.1 HERPES LABIALIS: ICD-10-CM

## 2023-10-23 RX ORDER — VALACYCLOVIR HYDROCHLORIDE 1 G/1
2000 TABLET, FILM COATED ORAL 2 TIMES DAILY
Qty: 4 TABLET | Refills: 11 | Status: SHIPPED | OUTPATIENT
Start: 2023-10-23 | End: 2024-07-29

## 2024-07-19 ENCOUNTER — MYC MEDICAL ADVICE (OUTPATIENT)
Dept: FAMILY MEDICINE | Facility: CLINIC | Age: 24
End: 2024-07-19

## 2024-07-19 ENCOUNTER — OFFICE VISIT (OUTPATIENT)
Dept: FAMILY MEDICINE | Facility: CLINIC | Age: 24
End: 2024-07-19
Payer: COMMERCIAL

## 2024-07-19 VITALS
DIASTOLIC BLOOD PRESSURE: 65 MMHG | HEIGHT: 63 IN | SYSTOLIC BLOOD PRESSURE: 97 MMHG | OXYGEN SATURATION: 98 % | WEIGHT: 117.3 LBS | HEART RATE: 89 BPM | BODY MASS INDEX: 20.79 KG/M2 | TEMPERATURE: 98.1 F

## 2024-07-19 DIAGNOSIS — Z00.00 ROUTINE GENERAL MEDICAL EXAMINATION AT A HEALTH CARE FACILITY: Primary | ICD-10-CM

## 2024-07-19 DIAGNOSIS — J30.2 SEASONAL ALLERGIC RHINITIS, UNSPECIFIED TRIGGER: ICD-10-CM

## 2024-07-19 DIAGNOSIS — B00.1 HERPES LABIALIS: ICD-10-CM

## 2024-07-19 DIAGNOSIS — Z11.4 SCREENING FOR HIV (HUMAN IMMUNODEFICIENCY VIRUS): ICD-10-CM

## 2024-07-19 DIAGNOSIS — Z12.4 CERVICAL CANCER SCREENING: ICD-10-CM

## 2024-07-19 DIAGNOSIS — Z13.29 SCREENING FOR ENDOCRINE DISORDER: ICD-10-CM

## 2024-07-19 DIAGNOSIS — L30.9 DERMATITIS: ICD-10-CM

## 2024-07-19 DIAGNOSIS — Z13.220 LIPID SCREENING: ICD-10-CM

## 2024-07-19 DIAGNOSIS — L30.9 FACIAL DERMATITIS: ICD-10-CM

## 2024-07-19 DIAGNOSIS — L20.89 OTHER ATOPIC DERMATITIS: ICD-10-CM

## 2024-07-19 DIAGNOSIS — L23.89 ALLERGIC CONTACT DERMATITIS DUE TO OTHER AGENTS: ICD-10-CM

## 2024-07-19 LAB
CHOLEST SERPL-MCNC: 169 MG/DL
FASTING STATUS PATIENT QL REPORTED: YES
FASTING STATUS PATIENT QL REPORTED: YES
GLUCOSE SERPL-MCNC: 83 MG/DL (ref 70–99)
HDLC SERPL-MCNC: 45 MG/DL
LDLC SERPL CALC-MCNC: 90 MG/DL
NONHDLC SERPL-MCNC: 124 MG/DL
TRIGL SERPL-MCNC: 168 MG/DL
TSH SERPL DL<=0.005 MIU/L-ACNC: 2.31 UIU/ML (ref 0.3–4.2)

## 2024-07-19 PROCEDURE — 99395 PREV VISIT EST AGE 18-39: CPT | Performed by: FAMILY MEDICINE

## 2024-07-19 PROCEDURE — 36415 COLL VENOUS BLD VENIPUNCTURE: CPT | Performed by: FAMILY MEDICINE

## 2024-07-19 PROCEDURE — 80061 LIPID PANEL: CPT | Performed by: FAMILY MEDICINE

## 2024-07-19 PROCEDURE — 84443 ASSAY THYROID STIM HORMONE: CPT | Performed by: FAMILY MEDICINE

## 2024-07-19 PROCEDURE — 82947 ASSAY GLUCOSE BLOOD QUANT: CPT | Performed by: FAMILY MEDICINE

## 2024-07-19 RX ORDER — VALACYCLOVIR HYDROCHLORIDE 1 G/1
2000 TABLET, FILM COATED ORAL 2 TIMES DAILY
Qty: 2 TABLET | Refills: 11 | Status: SHIPPED | OUTPATIENT
Start: 2024-07-19 | End: 2024-07-25

## 2024-07-19 RX ORDER — TACROLIMUS 1 MG/G
OINTMENT TOPICAL 2 TIMES DAILY
COMMUNITY
Start: 2024-07-19

## 2024-07-19 RX ORDER — DESONIDE 0.5 MG/G
CREAM TOPICAL DAILY PRN
COMMUNITY
Start: 2024-07-19

## 2024-07-19 SDOH — HEALTH STABILITY: PHYSICAL HEALTH: ON AVERAGE, HOW MANY MINUTES DO YOU ENGAGE IN EXERCISE AT THIS LEVEL?: 20 MIN

## 2024-07-19 SDOH — HEALTH STABILITY: PHYSICAL HEALTH: ON AVERAGE, HOW MANY DAYS PER WEEK DO YOU ENGAGE IN MODERATE TO STRENUOUS EXERCISE (LIKE A BRISK WALK)?: 4 DAYS

## 2024-07-19 ASSESSMENT — PAIN SCALES - GENERAL: PAINLEVEL: NO PAIN (0)

## 2024-07-19 ASSESSMENT — SOCIAL DETERMINANTS OF HEALTH (SDOH): HOW OFTEN DO YOU GET TOGETHER WITH FRIENDS OR RELATIVES?: MORE THAN THREE TIMES A WEEK

## 2024-07-19 NOTE — TELEPHONE ENCOUNTER
Physical too soon-patient is scheduled for 7/19/2024 but is not due till 8/1/2024 or after unless ok with insurance.    Lori Ha CMA ([facility])

## 2024-07-19 NOTE — PATIENT INSTRUCTIONS
Patient Education   Preventive Care Advice   This is general advice given by our system to help you stay healthy. However, your care team may have specific advice just for you. Please talk to your care team about your preventive care needs.  Nutrition  Eat 5 or more servings of fruits and vegetables each day.  Try wheat bread, brown rice and whole grain pasta (instead of white bread, rice, and pasta).  Get enough calcium and vitamin D. Check the label on foods and aim for 100% of the RDA (recommended daily allowance).  Lifestyle  Exercise at least 150 minutes each week  (30 minutes a day, 5 days a week).  Do muscle strengthening activities 2 days a week. These help control your weight and prevent disease.  No smoking.  Wear sunscreen to prevent skin cancer.  Have a dental exam and cleaning every 6 months.  Yearly exams  See your health care team every year to talk about:  Any changes in your health.  Any medicines your care team has prescribed.  Preventive care, family planning, and ways to prevent chronic diseases.  Shots (vaccines)   HPV shots (up to age 26), if you've never had them before.  Hepatitis B shots (up to age 59), if you've never had them before.  COVID-19 shot: Get this shot when it's due.  Flu shot: Get a flu shot every year.  Tetanus shot: Get a tetanus shot every 10 years.  Pneumococcal, hepatitis A, and RSV shots: Ask your care team if you need these based on your risk.  Shingles shot (for age 50 and up)  General health tests  Diabetes screening:  Starting at age 35, Get screened for diabetes at least every 3 years.  If you are younger than age 35, ask your care team if you should be screened for diabetes.  Cholesterol test: At age 39, start having a cholesterol test every 5 years, or more often if advised.  Bone density scan (DEXA): At age 50, ask your care team if you should have this scan for osteoporosis (brittle bones).  Hepatitis C: Get tested at least once in your life.  STIs (sexually  transmitted infections)  Before age 24: Ask your care team if you should be screened for STIs.  After age 24: Get screened for STIs if you're at risk. You are at risk for STIs (including HIV) if:  You are sexually active with more than one person.  You don't use condoms every time.  You or a partner was diagnosed with a sexually transmitted infection.  If you are at risk for HIV, ask about PrEP medicine to prevent HIV.  Get tested for HIV at least once in your life, whether you are at risk for HIV or not.  Cancer screening tests  Cervical cancer screening: If you have a cervix, begin getting regular cervical cancer screening tests starting at age 21.  Breast cancer scan (mammogram): If you've ever had breasts, begin having regular mammograms starting at age 40. This is a scan to check for breast cancer.  Colon cancer screening: It is important to start screening for colon cancer at age 45.  Have a colonoscopy test every 10 years (or more often if you're at risk) Or, ask your provider about stool tests like a FIT test every year or Cologuard test every 3 years.  To learn more about your testing options, visit:   .  For help making a decision, visit:   https://bit.ly/tm49673.  Prostate cancer screening test: If you have a prostate, ask your care team if a prostate cancer screening test (PSA) at age 55 is right for you.  Lung cancer screening: If you are a current or former smoker ages 50 to 80, ask your care team if ongoing lung cancer screenings are right for you.  For informational purposes only. Not to replace the advice of your health care provider. Copyright   2023 [employer]. All rights reserved. Clinically reviewed by the [facility] Transitions Program. [employer] 151035 - REV 01/24.

## 2024-07-19 NOTE — PROGRESS NOTES
Preventive Care Visit  [facility] NORA Babin MD, Family Medicine  Jul 19, 2024      Assessment & Plan     Routine general medical examination at a health care facility    Counseling  Reviewed preventive health counseling, as reflected in patient instructions       Regular exercise       Healthy diet/nutrition       Vision screening       Hearing screening       Family planning       Safe sex practices/STD prevention       Consider Hep C screening for all patients one time for ages 18-79 years     Screening for HIV (human immunodeficiency virus)  Declined    Cervical cancer screening  Unable to perform due to discomfort and pain    Facial dermatitis  - desonide (DESOWEN) 0.05 % external cream; Apply topically daily as needed If acute flare ups, then use it for 2 days daily and then change to regular treatment with Protopic    Other atopic dermatitis  - desonide (DESOWEN) 0.05 % external cream; Apply topically daily as needed If acute flare ups, then use it for 2 days daily and then change to regular treatment with Protopic    Allergic contact dermatitis due to other agents  - desonide (DESOWEN) 0.05 % external cream; Apply topically daily as needed If acute flare ups, then use it for 2 days daily and then change to regular treatment with Protopic    Dermatitis  - tacrolimus (PROTOPIC) 0.1 % external ointment; Apply topically 2 times daily    Herpes labialis  - valACYclovir (VALTREX) 1000 mg tablet; Take 2 tablets (2,000 mg) by mouth 2 times daily for 12 doses    Seasonal allergic rhinitis, unspecified trigger  Use of antihistamine over the counter encouraged, ear symptoms seem to be coming from seasonal allergies.    Screening for endocrine disorder    - Glucose; Future  - TSH with free T4 reflex; Future  - Glucose  - TSH with free T4 reflex    Lipid screening  - Lipid panel reflex to direct LDL Fasting; Future  - Lipid panel reflex to direct LDL  Fasting            Counseling  Appropriate preventive services were addressed with this patient via screening, questionnaire, or discussion as appropriate for fall prevention, nutrition, physical activity, Tobacco-use cessation, weight loss and cognition.  Checklist reviewing preventive services available has been given to the patient.          Joe Lopez is a 23 year old, presenting for the following:  Physical and Ear Problem        7/19/2024     9:22 AM   Additional Questions   Roomed by Dale URIBE   Accompanied by Self        Health Care Directive  Patient does not have a Health Care Directive or Living Will: Discussed advance care planning with patient; information given to patient to review.    Ear Problem      Concern - Bilateral Ear Problem  Onset: Started a couple of months ago  Description: Sounds like she is under water. Only one ear at a time  Intensity: mild  Progression of Symptoms:  same  Accompanying Signs & Symptoms: Internal itching, suffers from seasonal allergies  Previous history of similar problem: Besides seasonal allergies, no  Precipitating factors:        Worsened by: None  Alleviating factors:        Improved by: None  Therapies tried and outcome: None        7/19/2024   General Health   How would you rate your overall physical health? Good   Feel stress (tense, anxious, or unable to sleep) To some extent      (!) STRESS CONCERN      7/19/2024   Nutrition   Three or more servings of calcium each day? Yes   Diet: Regular (no restrictions)   How many servings of fruit and vegetables per day? (!) 2-3   How many sweetened beverages each day? 0-1            7/19/2024   Exercise   Days per week of moderate/strenous exercise 4 days   Average minutes spent exercising at this level 20 min            7/19/2024   Social Factors   Frequency of gathering with friends or relatives More than three times a week   Worry food won't last until get money to buy more No   Food not last or not have  enough money for food? No   Do you have housing? (Housing is defined as stable permanent housing and does not include staying ouside in a car, in a tent, in an abandoned building, in an overnight shelter, or couch-surfing.) Yes   Are you worried about losing your housing? No   Lack of transportation? No   Unable to get utilities (heat,electricity)? No            7/19/2024   Dental   Dentist two times every year? Yes            7/19/2024   TB Screening   Were you born outside of the [place]? Yes            Today's PHQ-2 Score:       7/19/2024     9:11 AM   PHQ-2 ( 1999 [employer])   Q1: Little interest or pleasure in doing things 0   Q2: Feeling down, depressed or hopeless 0   PHQ-2 Score 0   Q1: Little interest or pleasure in doing things Not at all   Q2: Feeling down, depressed or hopeless Not at all   PHQ-2 Score 0           7/19/2024   Substance Use   Alcohol more than 3/day or more than 7/wk No   Do you use any other substances recreationally? No        Social History     Tobacco Use    Smoking status: Never     Passive exposure: Never    Smokeless tobacco: Never   Vaping Use    Vaping status: Never Used   Substance Use Topics    Alcohol use: Never    Drug use: Never           7/19/2024   One time HIV Screening   Previous HIV test? I don't know          7/19/2024   STI Screening   New sexual partner(s) since last STI/HIV test? No                 7/19/2024   Contraception/Family Planning   Questions about contraception or family planning No           Reviewed and updated as needed this visit by Provider                    History reviewed. No pertinent past medical history.  Past Surgical History:   Procedure Laterality Date    NO HISTORY OF SURGERY       OB History   No obstetric history on file.         Review of Systems  CONSTITUTIONAL: NEGATIVE for fever, chills, change in weight  INTEGUMENTARY/SKIN: NEGATIVE for worrisome rashes, moles or lesions  EYES: NEGATIVE for vision changes or irritation  ENT/MOUTH: NEGATIVE  "for ear, mouth and throat problems  RESP: NEGATIVE for significant cough or SOB  BREAST: NEGATIVE for masses, tenderness or discharge  CV: NEGATIVE for chest pain, palpitations or peripheral edema  GI: NEGATIVE for nausea, abdominal pain, heartburn, or change in bowel habits  : NEGATIVE for frequency, dysuria, or hematuria  MUSCULOSKELETAL: NEGATIVE for significant arthralgias or myalgia  NEURO: NEGATIVE for weakness, dizziness or paresthesias  ENDOCRINE: NEGATIVE for temperature intolerance, skin/hair changes  HEME: NEGATIVE for bleeding problems  PSYCHIATRIC: NEGATIVE for changes in mood or affect     Objective    Exam  BP 97/65 (BP Location: Left arm, Patient Position: Sitting, Cuff Size: Adult Regular)   Pulse 89   Temp 98.1  F (36.7  C) (Temporal)   Ht 1.61 m (5' 3.39\")   Wt 53.2 kg (117 lb 4.8 oz)   LMP 07/08/2024 (Approximate)   SpO2 98%   BMI 20.53 kg/m     Estimated body mass index is 20.53 kg/m  as calculated from the following:    Height as of this encounter: 1.61 m (5' 3.39\").    Weight as of this encounter: 53.2 kg (117 lb 4.8 oz).    Physical Exam  GENERAL: alert and no distress  EYES: Eyes grossly normal to inspection, PERRL and conjunctivae and sclerae normal  HENT: ear canals and TM's normal, nose and mouth without ulcers or lesions  NECK: no adenopathy, no asymmetry, masses, or scars  RESP: lungs clear to auscultation - no rales, rhonchi or wheezes  BREAST: normal without masses, tenderness or nipple discharge and no palpable axillary masses or adenopathy  CV: regular rate and rhythm, normal S1 S2, no S3 or S4, no murmur, click or rub, no peripheral edema  ABDOMEN: soft, nontender, no hepatosplenomegaly, no masses and bowel sounds normal   (female): A blind PAP was attempted but this was discontinued and differed due to discomfort.  MS: no gross musculoskeletal defects noted, no edema  NEURO: Normal strength and tone, mentation intact and speech normal  PSYCH: mentation appears normal, " affect normal/bright        Signed Electronically by: Doretha Babin MD

## 2024-07-22 ENCOUNTER — DOCUMENTATION ONLY (OUTPATIENT)
Dept: LAB | Facility: CLINIC | Age: 24
End: 2024-07-22
Payer: COMMERCIAL

## 2024-07-22 NOTE — PROGRESS NOTES
[id number]  Specimen was not received into lab on 7/19/2024.     Please let us know if sample was or was not collected as soon as possible.    Thanks,   Efrain NguyenMLMCKENZIE)

## 2025-06-19 ENCOUNTER — PATIENT OUTREACH (OUTPATIENT)
Dept: CARE COORDINATION | Facility: CLINIC | Age: 25
End: 2025-06-19
Payer: COMMERCIAL

## 2025-07-03 ENCOUNTER — PATIENT OUTREACH (OUTPATIENT)
Dept: CARE COORDINATION | Facility: CLINIC | Age: 25
End: 2025-07-03
Payer: COMMERCIAL

## 2025-07-29 ENCOUNTER — E-VISIT (OUTPATIENT)
Dept: URGENT CARE | Facility: CLINIC | Age: 25
End: 2025-07-29
Payer: COMMERCIAL

## 2025-07-29 DIAGNOSIS — L70.9 ACNE, UNSPECIFIED ACNE TYPE: Primary | ICD-10-CM

## 2025-07-29 PROCEDURE — 99207 PR NON-BILLABLE SERV PER CHARTING: CPT | Performed by: PHYSICIAN ASSISTANT

## 2025-07-29 NOTE — PATIENT INSTRUCTIONS
Dear Jessica Styles?     After reviewing your responses, I am unable to make a diagnosis that can be treated online.  We do not prescribe spironolactone for acne through evisits.  Please see primary care or dermatology.    You will not be charged for this eVisit.     We are dedicated to helping you achieve your best health and would like to see you in one of our many clinic locations - a primary care provider would be ideal for your concern.    Please use [employer] to schedule a visit with a provider or call [phone] ([phone]) to schedule at any of our locations.    Thanks for choosing?us?as your health care partner,?   ?   Nancy Walter PA-C?

## 2025-08-30 ENCOUNTER — HEALTH MAINTENANCE LETTER (OUTPATIENT)
Age: 25
End: 2025-08-30